# Patient Record
Sex: MALE | Race: WHITE | Employment: FULL TIME | URBAN - METROPOLITAN AREA
[De-identification: names, ages, dates, MRNs, and addresses within clinical notes are randomized per-mention and may not be internally consistent; named-entity substitution may affect disease eponyms.]

---

## 2018-09-17 ENCOUNTER — OFFICE VISIT (OUTPATIENT)
Dept: FAMILY MEDICINE | Facility: OTHER | Age: 33
End: 2018-09-17
Payer: COMMERCIAL

## 2018-09-17 VITALS
RESPIRATION RATE: 16 BRPM | WEIGHT: 149 LBS | SYSTOLIC BLOOD PRESSURE: 124 MMHG | OXYGEN SATURATION: 100 % | HEART RATE: 78 BPM | DIASTOLIC BLOOD PRESSURE: 68 MMHG | TEMPERATURE: 98.6 F

## 2018-09-17 DIAGNOSIS — K64.4 EXTERNAL HEMORRHOIDS: ICD-10-CM

## 2018-09-17 DIAGNOSIS — D62 ANEMIA DUE TO BLOOD LOSS, ACUTE: Primary | ICD-10-CM

## 2018-09-17 DIAGNOSIS — K64.8 INTERNAL HEMORRHOIDS: ICD-10-CM

## 2018-09-17 LAB
BASOPHILS # BLD AUTO: 0 10E9/L (ref 0–0.2)
BASOPHILS NFR BLD AUTO: 0.6 %
DIFFERENTIAL METHOD BLD: ABNORMAL
EOSINOPHIL # BLD AUTO: 0.1 10E9/L (ref 0–0.7)
EOSINOPHIL NFR BLD AUTO: 1.4 %
ERYTHROCYTE [DISTWIDTH] IN BLOOD BY AUTOMATED COUNT: 12.3 % (ref 10–15)
HCT VFR BLD AUTO: 30.7 % (ref 40–53)
HGB BLD-MCNC: 10.1 G/DL (ref 13.3–17.7)
LYMPHOCYTES # BLD AUTO: 2.2 10E9/L (ref 0.8–5.3)
LYMPHOCYTES NFR BLD AUTO: 34.8 %
MCH RBC QN AUTO: 29.5 PG (ref 26.5–33)
MCHC RBC AUTO-ENTMCNC: 32.9 G/DL (ref 31.5–36.5)
MCV RBC AUTO: 90 FL (ref 78–100)
MONOCYTES # BLD AUTO: 0.7 10E9/L (ref 0–1.3)
MONOCYTES NFR BLD AUTO: 11.2 %
NEUTROPHILS # BLD AUTO: 3.2 10E9/L (ref 1.6–8.3)
NEUTROPHILS NFR BLD AUTO: 52 %
PLATELET # BLD AUTO: 224 10E9/L (ref 150–450)
RBC # BLD AUTO: 3.42 10E12/L (ref 4.4–5.9)
WBC # BLD AUTO: 6.2 10E9/L (ref 4–11)

## 2018-09-17 PROCEDURE — 85025 COMPLETE CBC W/AUTO DIFF WBC: CPT | Performed by: NURSE PRACTITIONER

## 2018-09-17 PROCEDURE — 99203 OFFICE O/P NEW LOW 30 MIN: CPT | Performed by: NURSE PRACTITIONER

## 2018-09-17 PROCEDURE — 36415 COLL VENOUS BLD VENIPUNCTURE: CPT | Performed by: NURSE PRACTITIONER

## 2018-09-17 ASSESSMENT — PAIN SCALES - GENERAL: PAINLEVEL: NO PAIN (0)

## 2018-09-17 ASSESSMENT — PATIENT HEALTH QUESTIONNAIRE - PHQ9
10. IF YOU CHECKED OFF ANY PROBLEMS, HOW DIFFICULT HAVE THESE PROBLEMS MADE IT FOR YOU TO DO YOUR WORK, TAKE CARE OF THINGS AT HOME, OR GET ALONG WITH OTHER PEOPLE: NOT DIFFICULT AT ALL
SUM OF ALL RESPONSES TO PHQ QUESTIONS 1-9: 1
SUM OF ALL RESPONSES TO PHQ QUESTIONS 1-9: 1

## 2018-09-17 NOTE — PROGRESS NOTES
SUBJECTIVE:   Ganga Candelario is a 32 year old male who presents to clinic today for the following health issues:      History of Present Illness   Frequency of exercise:  2-3 days/week  Duration of exercise:  30-45 minutes  Taking medications regularly:  Yes  Medication side effects:  Not applicable and None  Additional concerns today:  No    Hemorrhoids  Onset: Several years, worse in the past two weeks    Description:   Pain: no   Itching: no     Accompanying Signs & Symptoms:  Blood streaked toilet paper: YES  Blood in stool: YES  Changes in stool pattern: no     History:   Any previous GI studies done:Abdominal x-rays, two years ago  Family History of colon cancer: YES- aunt on paternal side    Precipitating factors:   None    Alleviating factors:  Preperation H with good relief    Therapies Tried and outcome: preparation H  Problem list and histories reviewed & adjusted, as indicated.  Additional history: as documented    Has taken iron for past two days.  Worried about anemia due to blood loss in toilet.    Has had hemorriods for three years.  Father had hemorroids.  He did have banding.  Using creams- for a year and half off and on.  Helps so when external flares helps with that.  Bowel movements every day.  Used to be twice a day.  Is now taking more time to use the bathroom- 20 minutes to 30 minutes.  Straining.  When has it is soft.  Felt was started to get constipated.  Got worried with bleeding.  Started stool softeners.  Is drinking a lot of water.  Doing metamucil.  Started last night.  Felt times when he went it was so dark red he could not see through water.  Bleeding stops by pushing hemorrhoids in will stop.  Wears pads as he has times where he just started bleeding.    Feels dizzy.  If doing something active will feel heart race like going up stairs.      Paternal aunt with colon cancer.  Aunt with genetic cell- states his father did not have the genetic cell.    There is no problem list on file  for this patient.    History reviewed. No pertinent surgical history.    Social History   Substance Use Topics     Smoking status: Never Smoker     Smokeless tobacco: Never Used     Alcohol use 0.6 oz/week     1 Glasses of wine per week      Comment: very seldom     History reviewed. No pertinent family history.      No current outpatient prescriptions on file.     No Known Allergies    ROS:  Constitutional, HEENT, cardiovascular, pulmonary, gi and gu systems are negative, except as otherwise noted.    OBJECTIVE:     /68  Pulse 78  Temp 98.6  F (37  C)  Resp 16  Wt 149 lb (67.6 kg)  SpO2 100%  There is no height or weight on file to calculate BMI.   GENERAL: alert, no distress and pale  NECK: no adenopathy, no asymmetry, masses, or scars and thyroid normal to palpation  RESP: lungs clear to auscultation - no rales, rhonchi or wheezes  CV: regular rate and rhythm, normal S1 S2, no S3 or S4, no murmur, click or rub, no peripheral edema and peripheral pulses strong  ABDOMEN: soft, nontender, no hepatosplenomegaly, no masses and bowel sounds normal  RECTAL (male): external and internal hemorrhoids, exam was uncomfortable for patient  MS: no gross musculoskeletal defects noted, no edema    Diagnostic Test Results:  Results for orders placed or performed in visit on 09/17/18 (from the past 24 hour(s))   CBC with platelets and differential   Result Value Ref Range    WBC 6.2 4.0 - 11.0 10e9/L    RBC Count 3.42 (L) 4.4 - 5.9 10e12/L    Hemoglobin 10.1 (L) 13.3 - 17.7 g/dL    Hematocrit 30.7 (L) 40.0 - 53.0 %    MCV 90 78 - 100 fl    MCH 29.5 26.5 - 33.0 pg    MCHC 32.9 31.5 - 36.5 g/dL    RDW 12.3 10.0 - 15.0 %    Platelet Count 224 150 - 450 10e9/L    % Neutrophils 52.0 %    % Lymphocytes 34.8 %    % Monocytes 11.2 %    % Eosinophils 1.4 %    % Basophils 0.6 %    Absolute Neutrophil 3.2 1.6 - 8.3 10e9/L    Absolute Lymphocytes 2.2 0.8 - 5.3 10e9/L    Absolute Monocytes 0.7 0.0 - 1.3 10e9/L    Absolute  Eosinophils 0.1 0.0 - 0.7 10e9/L    Absolute Basophils 0.0 0.0 - 0.2 10e9/L    Diff Method Automated Method        ASSESSMENT/PLAN:     Problem List Items Addressed This Visit     None      Visit Diagnoses     Anemia due to blood loss, acute    -  Primary    Relevant Orders    CBC with platelets and differential (Completed)    GENERAL SURG ADULT REFERRAL    External hemorrhoids        Relevant Orders    GENERAL SURG ADULT REFERRAL    Internal hemorrhoids        Relevant Orders    GENERAL SURG ADULT REFERRAL           Acute blood loss anemia likely secondary to his hemorrhoids.  No other signs of blood loss.  He is dizzy and has some exertional fatigue- was able to get him in with General Surgery in two days.  Advised to seek ED care if bleeding or symptoms worsen.  Lisa Watts, CHRIS Watts, LOCO  [facility]  Answers for HPI/ROS submitted by the patient on 9/17/2018   PHQ-2 Score: 0  If you checked off any problems, how difficult have these problems made it for you to do your work, take care of things at home, or get along with other people?: Not difficult at all  PHQ9 TOTAL SCORE: 1

## 2018-09-17 NOTE — PATIENT INSTRUCTIONS
We have scheduled you in [place] with the general surgeon.  If this time does not work for you please call [phone]  Wednesday [place] at 1115 am with Dr. Khoury              Hemorrhoids    Hemorrhoids are swollen and inflamed veins inside the rectum and near the anus. The rectum is the last several inches of the colon. The anus is the passage between the rectum and the outside of the body.  Causes  The veins can become swollen due to increased pressure in them. This is most often caused by:    Chronic constipation or diarrhea    Straining when having a bowel movement    Sitting too long on the toilet    A low-fiber diet    Pregnancy  Symptoms    Bleeding from the rectum (this may be noticeable after bowel movements)    Lump near the anus    Itching around the anus    Pain around the anus  There are different types of hemorrhoids. Depending on the type you have and the severity, you may be able to treat yourself at home. In some cases, a procedure may be the best treatment option. Your healthcare provider can tell you more about this, if needed.  Home care  General care    To get relief from pain or itching, try:  ? Medicines. Your healthcare provider may recommend stool softeners, suppositories, or laxatives to help manage constipation. Use these exactly as directed.  ? Sitz baths. A sitz bath involves sitting in a few inches of warm bath water. Be careful not to make the water so hot that you burn yourself--test it before sitting in it. Soak for about 10 to 15 minutes a few times a day. This may help relieve pain.  ? Topical products. Your healthcare provider may prescribe or recommend creams, ointments, or pads that can be applied to the hemorrhoid. Use these exactly as directed.  Tips to help prevent hemorrhoids    Eat more fiber. Fiber adds bulk to stool and absorbs water as it moves through your colon. This makes stool softer and easier to pass.  ? Increase the fiber in your diet with more  fiber-rich foods. These include fresh fruit, vegetables, and whole grains.  ? Take a fiber supplement or bulking agent, if advised by your healthcare provider. These include products such as psyllium or methylcellulose.    Drink more water. Your healthcare provider may direct you to drink plenty of water. This can help keep stool soft.    Be more active. Frequent exercise aids digestion and helps prevent constipation. It may also help make bowel movements more regular.    Don t strain during bowel movements. This can make hemorrhoids more likely. Also, don t sit on the toilet for long periods of time.  Follow-up care  Follow up with your healthcare provider as advised. If a culture or imaging tests were done, someone will let you know the results when they are ready. This may take a few days or longer. If your healthcare provider recommends a procedure for your hemorrhoids, these options can be discussed. Options may include surgery and outpatient office treatments.  When to seek medical advice  Call your healthcare provider right away if any of these occur:    Increased bleeding from the rectum    Increased pain around the rectum or anus    Weakness or dizziness  Call 911  Call 911 if any of these occur:    Trouble breathing or swallowing    Fainting or loss of consciousness    Unusually fast heart rate    Vomiting blood    Large amounts of blood in stool or black, tarry stools  Date Last Reviewed: 9/1/2017 2000-2017 [employer]. [address] [place] [address], [place] [address]. All rights reserved. This information is not intended as a substitute for professional medical care. Always follow your healthcare professional's instructions.        Treating Hemorrhoids: Self-Care    Follow your healthcare provider s advice about caring for your hemorrhoids at home. Some treatments help relieve symptoms right away. Others involve making changes in your diet and exercise habits. These can help ease constipation  and prevent hemorrhoid symptoms from coming back.  Relieving symptoms  Your healthcare provider may prescribe anti-inflammatory medicine to help ease your symptoms. The following tips will also help relieve pain and swelling.    Take sitz baths. Taking a sitz bath means sitting in a few inches of warm bath water. Soaking for 10 minutes twice a day can provide welcome relief from painful hemorrhoids. It can also help the area stay clean.    Develop good bowel habits. Use the bathroom when you need to. Don t ignore the urge to move your bowels. This can lead to constipation, hard stools, and straining. Also, don t read while on the toilet. Sit only as long as needed. Wipe gently with soft, unscented toilet tissue or baby wipes.    Use ice packs. Placing an ice pack on a thrombosed external hemorrhoid can help relieve pain right away. It will also help reduce the blood clot. Use the ice for 15 to 20 minutes at a time. Keep a cloth between the ice and your skin to prevent skin damage.    Use other measures. Laxatives and enemas can help ease constipation. But use them only on your healthcare provider s advice. For symptom relief, try using cotton pads soaked in witch hazel. These are available at most drugstores. Over-the-counter hemorrhoid ointments and petroleum jelly can also provide relief.  Add fiber to your diet  Adding fiber to your diet can help relieve constipation by making stools softer and easier to pass. To increase your fiber intake, your healthcare provider may recommend a bulking agent, such as psyllium. This is a high-fiber supplement available at most grocery stores and drugstores. Eating more fiber-rich foods will also help. There are two types of fiber:    Insoluble fiber is the main ingredient in bulking agents. It s also found in foods such as wheat bran, whole-grain breads, fresh fruits, and vegetables.    Soluble fiber is found in foods such as oat bran. Although soluble fiber is good for you, it  may not ease constipation as much as foods high in insoluble fiber.  Drink more water  Along with a high-fiber diet, drinking more water can help ease constipation. This is because insoluble fiber absorbs water, making stools soft and bulky. Be sure to drink plenty of water throughout the day. Drinking fruit juices, such as prune juice or apple juice, can also help prevent constipation.  Get more exercise  Regular exercise aids digestion and helps prevent constipation. It s also great for your health. So talk with your healthcare provider about starting an exercise program. Low-impact activities, such as swimming or walking, are good places to start. Take it easy at first. And remember to drink plenty of water when you exercise.  High-fiber foods  High-fiber foods offer many benefits. By making your stools softer, they help heal and prevent swollen hemorrhoids. They may also help reduce the risk of colon and rectal cancer. Best of all, they re usually low in calories and taste great. Here are some examples of fiber-rich foods.    Whole grains, such as wheat bran, corn bran, and brown rice.    Vegetables, especially carrots, broccoli, cabbage, and peas.    Fruits, such as apples, bananas, raisins, peaches, and pears.    Nuts and legumes, especially peanuts, lentils, and kidney beans.  Easy ways to add fiber  The tips below offer some simple ways to add more high-fiber foods to your meals.    Start your day with a high-fiber breakfast. Eat a wheat bran cereal along with a sliced banana. Or, try peanut butter on whole-wheat toast.    Eat carrot sticks for snacks. They re easy to prepare, taste great, and are low in calories.    Use whole-grain breads instead of white bread for sandwiches.    Eat fruits for treats. Try an apple and some raisins instead of a candy bar.   Date Last Reviewed: 7/1/2016 2000-2017 [employer]. [address] [place] [address], [place] [address]. All rights reserved. This information is  not intended as a substitute for professional medical care. Always follow your healthcare professional's instructions.        Iron Supplements  Most people think of iron as a metal used to make pots, frying pans, and soup kettles. This same metal (or mineral) also plays a vital role in the body. Iron helps the blood cells carry oxygen. When you don t get enough iron, you may feel tired and lack energy. Over time, without enough iron, your body makes fewer red blood cells. This can cause a condition called iron-deficiency anemia. Since your body doesn t make iron, you must get it from foods or supplements.     Food sources of iron  Iron is found in a few types of foods. Good sources include:    Red meat, poultry, fish, eggs    Dried fruits (especially raisins, prunes, figs)    Legumes such as dried beans and lentils    Breads and cereals with iron added    Blackstrap molasses    Spinach    Foods cooked in cast-iron pans. This is especially true of acidic foods, such as tomatoes and sidney.   Why use a supplement?  Women often need additional iron because they lose blood during their menstrual period. But even grown men and boys may need a supplement at some point. You may need an iron supplement if any of the following is true for you:    I rarely eat foods that are high in iron (such as red meat, poultry, dried beans, and foods with iron added).    I am a vegetarian and I rarely eat legumes (dried beans, peas, lentils).    I have heavy menstrual periods.    I am pregnant or breastfeeding.    I have been diagnosed with iron-deficiency anemia.  If you take iron  Here are some tips to help you get the most from an iron supplement:    Take it with vitamin C for better absorption.    Don t take an iron supplement with milk. The calcium in milk limits iron absorption.    Iron supplements can cause constipation. To prevent this, drink plenty of water, eat high-fiber foods, and exercise often. Also try an iron supplement  with an added stool softener.    Eat a healthy diet to get all the nutrients your body needs.  Recommended iron intake  The amount of iron each person needs is different, and varies by age. Women who are pregnant or breastfeeding need extra iron. But taking more than the suggested amount is not always healthy. Your healthcare provider can help you choose the right amount of iron for you.   Date Last Reviewed: 6/1/2017 2000-2017 [employer]. [address] [place] [address], [place] [address]. All rights reserved. This information is not intended as a substitute for professional medical care. Always follow your healthcare professional's instructions.

## 2018-09-17 NOTE — MR AVS SNAPSHOT
After Visit Summary   9/17/2018    Ganga Candelario    MRN: [id number]           Patient Information     Date Of Birth          1985        Visit Information        Provider Department      9/17/2018 5:20 PM Lisa Watts NP [facility]        Today's Diagnoses     Anemia due to blood loss, acute    -  1    External hemorrhoids        Internal hemorrhoids          Care Instructions    We have scheduled you in [place] with the general surgeon.  If this time does not work for you please call [phone]  Wednesday [place] at 1115 am with Dr. Khoury              Hemorrhoids    Hemorrhoids are swollen and inflamed veins inside the rectum and near the anus. The rectum is the last several inches of the colon. The anus is the passage between the rectum and the outside of the body.  Causes  The veins can become swollen due to increased pressure in them. This is most often caused by:    Chronic constipation or diarrhea    Straining when having a bowel movement    Sitting too long on the toilet    A low-fiber diet    Pregnancy  Symptoms    Bleeding from the rectum (this may be noticeable after bowel movements)    Lump near the anus    Itching around the anus    Pain around the anus  There are different types of hemorrhoids. Depending on the type you have and the severity, you may be able to treat yourself at home. In some cases, a procedure may be the best treatment option. Your healthcare provider can tell you more about this, if needed.  Home care  General care    To get relief from pain or itching, try:  ? Medicines. Your healthcare provider may recommend stool softeners, suppositories, or laxatives to help manage constipation. Use these exactly as directed.  ? Sitz baths. A sitz bath involves sitting in a few inches of warm bath water. Be careful not to make the water so hot that you burn yourself--test it before sitting in it. Soak for about 10 to 15 minutes a few times a  day. This may help relieve pain.  ? Topical products. Your healthcare provider may prescribe or recommend creams, ointments, or pads that can be applied to the hemorrhoid. Use these exactly as directed.  Tips to help prevent hemorrhoids    Eat more fiber. Fiber adds bulk to stool and absorbs water as it moves through your colon. This makes stool softer and easier to pass.  ? Increase the fiber in your diet with more fiber-rich foods. These include fresh fruit, vegetables, and whole grains.  ? Take a fiber supplement or bulking agent, if advised by your healthcare provider. These include products such as psyllium or methylcellulose.    Drink more water. Your healthcare provider may direct you to drink plenty of water. This can help keep stool soft.    Be more active. Frequent exercise aids digestion and helps prevent constipation. It may also help make bowel movements more regular.    Don t strain during bowel movements. This can make hemorrhoids more likely. Also, don t sit on the toilet for long periods of time.  Follow-up care  Follow up with your healthcare provider as advised. If a culture or imaging tests were done, someone will let you know the results when they are ready. This may take a few days or longer. If your healthcare provider recommends a procedure for your hemorrhoids, these options can be discussed. Options may include surgery and outpatient office treatments.  When to seek medical advice  Call your healthcare provider right away if any of these occur:    Increased bleeding from the rectum    Increased pain around the rectum or anus    Weakness or dizziness  Call 911  Call 911 if any of these occur:    Trouble breathing or swallowing    Fainting or loss of consciousness    Unusually fast heart rate    Vomiting blood    Large amounts of blood in stool or black, tarry stools  Date Last Reviewed: 9/1/2017 2000-2017 [employer]. [address] [place] [address]. All rights  reserved. This information is not intended as a substitute for professional medical care. Always follow your healthcare professional's instructions.        Treating Hemorrhoids: Self-Care    Follow your healthcare provider s advice about caring for your hemorrhoids at home. Some treatments help relieve symptoms right away. Others involve making changes in your diet and exercise habits. These can help ease constipation and prevent hemorrhoid symptoms from coming back.  Relieving symptoms  Your healthcare provider may prescribe anti-inflammatory medicine to help ease your symptoms. The following tips will also help relieve pain and swelling.    Take sitz baths. Taking a sitz bath means sitting in a few inches of warm bath water. Soaking for 10 minutes twice a day can provide welcome relief from painful hemorrhoids. It can also help the area stay clean.    Develop good bowel habits. Use the bathroom when you need to. Don t ignore the urge to move your bowels. This can lead to constipation, hard stools, and straining. Also, don t read while on the toilet. Sit only as long as needed. Wipe gently with soft, unscented toilet tissue or baby wipes.    Use ice packs. Placing an ice pack on a thrombosed external hemorrhoid can help relieve pain right away. It will also help reduce the blood clot. Use the ice for 15 to 20 minutes at a time. Keep a cloth between the ice and your skin to prevent skin damage.    Use other measures. Laxatives and enemas can help ease constipation. But use them only on your healthcare provider s advice. For symptom relief, try using cotton pads soaked in witch hazel. These are available at most drugstores. Over-the-counter hemorrhoid ointments and petroleum jelly can also provide relief.  Add fiber to your diet  Adding fiber to your diet can help relieve constipation by making stools softer and easier to pass. To increase your fiber intake, your healthcare provider may recommend a bulking agent,  such as psyllium. This is a high-fiber supplement available at most grocery stores and drugstores. Eating more fiber-rich foods will also help. There are two types of fiber:    Insoluble fiber is the main ingredient in bulking agents. It s also found in foods such as wheat bran, whole-grain breads, fresh fruits, and vegetables.    Soluble fiber is found in foods such as oat bran. Although soluble fiber is good for you, it may not ease constipation as much as foods high in insoluble fiber.  Drink more water  Along with a high-fiber diet, drinking more water can help ease constipation. This is because insoluble fiber absorbs water, making stools soft and bulky. Be sure to drink plenty of water throughout the day. Drinking fruit juices, such as prune juice or apple juice, can also help prevent constipation.  Get more exercise  Regular exercise aids digestion and helps prevent constipation. It s also great for your health. So talk with your healthcare provider about starting an exercise program. Low-impact activities, such as swimming or walking, are good places to start. Take it easy at first. And remember to drink plenty of water when you exercise.  High-fiber foods  High-fiber foods offer many benefits. By making your stools softer, they help heal and prevent swollen hemorrhoids. They may also help reduce the risk of colon and rectal cancer. Best of all, they re usually low in calories and taste great. Here are some examples of fiber-rich foods.    Whole grains, such as wheat bran, corn bran, and brown rice.    Vegetables, especially carrots, broccoli, cabbage, and peas.    Fruits, such as apples, bananas, raisins, peaches, and pears.    Nuts and legumes, especially peanuts, lentils, and kidney beans.  Easy ways to add fiber  The tips below offer some simple ways to add more high-fiber foods to your meals.    Start your day with a high-fiber breakfast. Eat a wheat bran cereal along with a sliced banana. Or, try  peanut butter on whole-wheat toast.    Eat carrot sticks for snacks. They re easy to prepare, taste great, and are low in calories.    Use whole-grain breads instead of white bread for sandwiches.    Eat fruits for treats. Try an apple and some raisins instead of a candy bar.   Date Last Reviewed: 7/1/2016 2000-2017 [employer]. [address] [place] [address], [place] [address]. All rights reserved. This information is not intended as a substitute for professional medical care. Always follow your healthcare professional's instructions.        Iron Supplements  Most people think of iron as a metal used to make pots, frying pans, and soup kettles. This same metal (or mineral) also plays a vital role in the body. Iron helps the blood cells carry oxygen. When you don t get enough iron, you may feel tired and lack energy. Over time, without enough iron, your body makes fewer red blood cells. This can cause a condition called iron-deficiency anemia. Since your body doesn t make iron, you must get it from foods or supplements.     Food sources of iron  Iron is found in a few types of foods. Good sources include:    Red meat, poultry, fish, eggs    Dried fruits (especially raisins, prunes, figs)    Legumes such as dried beans and lentils    Breads and cereals with iron added    Blackstrap molasses    Spinach    Foods cooked in cast-iron pans. This is especially true of acidic foods, such as tomatoes and sidney.   Why use a supplement?  Women often need additional iron because they lose blood during their menstrual period. But even grown men and boys may need a supplement at some point. You may need an iron supplement if any of the following is true for you:    I rarely eat foods that are high in iron (such as red meat, poultry, dried beans, and foods with iron added).    I am a vegetarian and I rarely eat legumes (dried beans, peas, lentils).    I have heavy menstrual periods.    I am pregnant or  breastfeeding.    I have been diagnosed with iron-deficiency anemia.  If you take iron  Here are some tips to help you get the most from an iron supplement:    Take it with vitamin C for better absorption.    Don t take an iron supplement with milk. The calcium in milk limits iron absorption.    Iron supplements can cause constipation. To prevent this, drink plenty of water, eat high-fiber foods, and exercise often. Also try an iron supplement with an added stool softener.    Eat a healthy diet to get all the nutrients your body needs.  Recommended iron intake  The amount of iron each person needs is different, and varies by age. Women who are pregnant or breastfeeding need extra iron. But taking more than the suggested amount is not always healthy. Your healthcare provider can help you choose the right amount of iron for you.   Date Last Reviewed: 6/1/2017 2000-2017 [employer]. [address] [place] [address]. All rights reserved. This information is not intended as a substitute for professional medical care. Always follow your healthcare professional's instructions.                Follow-ups after your visit        Additional Services     GENERAL SURG ADULT REFERRAL       Your provider has referred you to: [facility]: [facility] [phone]   [email or web address]    Please be aware that coverage of these services is subject to the terms and limitations of your health insurance plan.  Call member services at your health plan with any benefit or coverage questions.      Please bring the following with you to your appointment:    (1) Any X-Rays, CTs or MRIs which have been performed.  Contact the facility where they were done to arrange for  prior to your scheduled appointment.   (2) List of current medications   (3) This referral request   (4) Any documents/labs given to you for this referral                  Follow-up notes from your care  team     Return in about 2 days (around 9/19/2018) for general surgery.      Your next 10 appointments already scheduled     Sep 19, 2018 11:15 AM CDT   New Visit with Danilo Khoury,    [facility] ([facility])    [address] [place] [address] [phone]              Who to contact     If you have questions or need follow up information about today's clinic visit or your schedule please contact [facility] directly at [phone].  Normal or non-critical lab and imaging results will be communicated to you by MyChart, letter or phone within 4 business days after the clinic has received the results. If you do not hear from us within 7 days, please contact the clinic through MyChart or phone. If you have a critical or abnormal lab result, we will notify you by phone as soon as possible.  Submit refill requests through [employer] or call your pharmacy and they will forward the refill request to us. Please allow 3 business days for your refill to be completed.          Additional Information About Your Visit        Care EveryWhere ID     This is your Care EveryWhere ID. This could be used by other organizations to access your [place] medical records  [id number]        Your Vitals Were     Pulse Temperature Respirations Pulse Oximetry          78 98.6  F (37  C) 16 100%         Blood Pressure from Last 3 Encounters:   09/17/18 124/68    Weight from Last 3 Encounters:   09/17/18 149 lb (67.6 kg)              We Performed the Following     CBC with platelets and differential     GENERAL SURG ADULT REFERRAL        Primary Care Provider Fax #    Physician No Ref-Primary [phone]       No address on file        Equal Access to Services     JOSEPH JOHNSTON : Elicia Weber, warenea mehta, qaybta kaliat williamson. So [facility] [phone].    ATENCIÓN: Si habla español, tiene a duckworth disposición  servicios gratuitos de asistencia lingüística. Keisha lema [phone].    We comply with applicable federal civil rights laws and [place] laws. We do not discriminate on the basis of race, color, national origin, age, disability, sex, sexual orientation, or gender identity.            Thank you!     Thank you for choosing [facility]  for your care. Our goal is always to provide you with excellent care. Hearing back from our patients is one way we can continue to improve our services. Please take a few minutes to complete the written survey that you may receive in the mail after your visit with us. Thank you!             Your Updated Medication List - Protect others around you: Learn how to safely use, store and throw away your medicines at www.disposemymeds.org.      Notice  As of 9/17/2018  6:04 PM    You have not been prescribed any medications.

## 2018-09-18 ASSESSMENT — PATIENT HEALTH QUESTIONNAIRE - PHQ9: SUM OF ALL RESPONSES TO PHQ QUESTIONS 1-9: 1

## 2018-09-19 ENCOUNTER — OFFICE VISIT (OUTPATIENT)
Dept: SURGERY | Facility: CLINIC | Age: 33
End: 2018-09-19
Payer: COMMERCIAL

## 2018-09-19 VITALS
TEMPERATURE: 97.4 F | HEIGHT: 74 IN | DIASTOLIC BLOOD PRESSURE: 68 MMHG | SYSTOLIC BLOOD PRESSURE: 122 MMHG | BODY MASS INDEX: 19.25 KG/M2 | WEIGHT: 150 LBS

## 2018-09-19 DIAGNOSIS — K64.8 BLEEDING INTERNAL HEMORRHOIDS: Primary | ICD-10-CM

## 2018-09-19 PROCEDURE — 99203 OFFICE O/P NEW LOW 30 MIN: CPT | Mod: 25 | Performed by: SURGERY

## 2018-09-19 PROCEDURE — 46221 LIGATION OF HEMORRHOID(S): CPT | Performed by: SURGERY

## 2018-09-19 NOTE — LETTER
9/19/2018        RE: Ganga Candelario  [address]  [place] [address]      Ganga was seen and treated in our clinic on 9/19/18. Please excuse from work today and tomorrow 9/19-9/20.      Sincerely,        aDnilo Khoury, DO

## 2018-09-19 NOTE — PROGRESS NOTES
"Patient seen in consultation for bleeding internal hemorrhoids by Lisa Watts    HPI:  Patient is a 32 year old male with several year history of hemorrhoid problems. Recently he has had significant bleeding associated with almost every bowel movement. He says his stool is relatively soft and normal, but he is straining to have them. He started taking metamucil and miralax a few days ago and says his stools seems to be more soft now. He has felt weak and tired so they checked a Hgb and he is anemic as well, presumably from his bleeding hemorrhoids. He has some family history of colon cancer but none in his primary relatives. He uses wet wipes.    Review Of Systems    Skin: negative  Ears/Nose/Throat: negative  Respiratory: No shortness of breath, dyspnea on exertion, cough, or hemoptysis  Cardiovascular: negative  Gastrointestinal: negative  Genitourinary: negative  Musculoskeletal: negative  Neurologic: negative  Hematologic/Lymphatic/Immunologic: negative  Endocrine: negative      No past medical history on file.    No past surgical history on file.    No family history on file.    Social History     Social History     Marital status:      Spouse name: N/A     Number of children: N/A     Years of education: N/A     Occupational History     Not on file.     Social History Main Topics     Smoking status: Never Smoker     Smokeless tobacco: Never Used     Alcohol use 0.6 oz/week     1 Glasses of wine per week      Comment: very seldom     Drug use: No     Sexual activity: Yes     Partners: Female     Other Topics Concern     Not on file     Social History Narrative       Current Outpatient Prescriptions   Medication Sig Dispense Refill     psyllium (METAMUCIL) 58.6 % POWD Take by mouth daily         Medications and history reviewed    Physical exam:  Vitals: /68  Temp 97.4  F (36.3  C) (Temporal)  Ht 1.88 m (6' 2\")  Wt 68 kg (150 lb)  BMI 19.26 kg/m2  BMI= Body mass index is 19.26 " kg/(m^2).    Constitutional: Healthy, alert, non-distressed   Head: Normo-cephalic, atraumatic, no lesions, masses or tenderness   Cardiovascular: RRR, no new murmurs, +S1, +S2 heart sounds, no clicks, rubs or gallops   Respiratory: CTAB, no rales, rhonchi or wheezing, equal chest rise, good respiratory effort   Gastrointestinal: Soft, non-tender, non distended, no rebound rigidity or guarding, no masses or hernias palpated   : Deferred  Rectal: Small skin tags/external hemorrhoids, not thrombosed. Internally he has several large hemorrhoids, Grade I-II, some evidence of bleeding on posterior columns on either side of midline  Musculoskeletal: Moves all extremities, normal  strength, no deformities noted   Skin: No suspicious lesions or rashes   Psychiatric: Mentation appears normal, affect appropriate   Hematologic/Lymphatic/Immunologic: Normal cervical and supraclavicular lymph nodes   Patient able to get up on table without difficulty.    Labs show:  No results found for this or any previous visit (from the past 24 hour(s)).      Assessment:     ICD-10-CM    1. Bleeding internal hemorrhoids K64.8      Plan: we will band 2 of his internal hemorrhoids today, specifically, the two that appear to have been bleeding recently. We discussed other healthy bathroom habits and a handout was provided. He will return or call if his bleeding does not significantly improve with this banding. He may require more than one banding session. We discussed the procedure in detail and the risks and what complications to look for. See below for procedure.    Danilo Khoury,     PROCEDURE: internal hemorrhoid banding x2   Written consent was obtained    Using the usual technique, suction internal hemorrhoid banding was performed. Two separate hemorrhoid columns were isolated and banded using the suction device. No pain after placement of bands. Minor bleeding from the speculum exam. The procedure was well tolerated and  without complications.

## 2018-09-19 NOTE — MR AVS SNAPSHOT
"              After Visit Summary   9/19/2018    Ganga Candelario    MRN: [id number]           Patient Information     Date Of Birth          1985        Visit Information        Provider Department      9/19/2018 11:15 AM Danilo Khoury, DO [facility]        Today's Diagnoses     Bleeding internal hemorrhoids    -  1       Follow-ups after your visit        Who to contact     If you have questions or need follow up information about today's clinic visit or your schedule please contact [facility] directly at [phone].  Normal or non-critical lab and imaging results will be communicated to you by MyChart, letter or phone within 4 business days after the clinic has received the results. If you do not hear from us within 7 days, please contact the clinic through MyChart or phone. If you have a critical or abnormal lab result, we will notify you by phone as soon as possible.  Submit refill requests through [employer] or call your pharmacy and they will forward the refill request to us. Please allow 3 business days for your refill to be completed.          Additional Information About Your Visit        Care EveryWhere ID     This is your Care EveryWhere ID. This could be used by other organizations to access your [place] medical records  [id number]        Your Vitals Were     Temperature Height BMI (Body Mass Index)             97.4  F (36.3  C) (Temporal) 1.88 m (6' 2\") 19.26 kg/m2          Blood Pressure from Last 3 Encounters:   09/19/18 122/68   09/17/18 124/68    Weight from Last 3 Encounters:   09/19/18 68 kg (150 lb)   09/17/18 67.6 kg (149 lb)              Today, you had the following     No orders found for display       Primary Care Provider Fax #    Physician No Ref-Primary [phone]       No address on file        Equal Access to Services     CHRISTINA JOHNSTON AH: Elicia Weber, sol mehta, jenise vega, liat butt " new lakhani ah. So [facility] [phone].    ATENCIÓN: Si habla radhames, tiene a duckworth disposición servicios gratuitos de asistencia lingüística. Keisha al [phone].    We comply with applicable federal civil rights laws and [place] laws. We do not discriminate on the basis of race, color, national origin, age, disability, sex, sexual orientation, or gender identity.            Thank you!     Thank you for choosing [facility]  for your care. Our goal is always to provide you with excellent care. Hearing back from our patients is one way we can continue to improve our services. Please take a few minutes to complete the written survey that you may receive in the mail after your visit with us. Thank you!             Your Updated Medication List - Protect others around you: Learn how to safely use, store and throw away your medicines at www.disposemymeds.org.          This list is accurate as of 9/19/18 12:03 PM.  Always use your most recent med list.                   Brand Name Dispense Instructions for use Diagnosis    psyllium 58.6 % Powd    METAMUCIL     Take by mouth daily

## 2018-09-19 NOTE — LETTER
9/19/2018         RE: Ganga Candelario  [address] [address]  [place] [address]        Dear Colleague,    Thank you for referring your patient, Ganga Candelario, to the [facility]. Please see a copy of my visit note below.    Patient seen in consultation for bleeding internal hemorrhoids by Lisa Watts    HPI:  Patient is a 32 year old male with several year history of hemorrhoid problems. Recently he has had significant bleeding associated with almost every bowel movement. He says his stool is relatively soft and normal, but he is straining to have them. He started taking metamucil and miralax a few days ago and says his stools seems to be more soft now. He has felt weak and tired so they checked a Hgb and he is anemic as well, presumably from his bleeding hemorrhoids. He has some family history of colon cancer but none in his primary relatives. He uses wet wipes.    Review Of Systems    Skin: negative  Ears/Nose/Throat: negative  Respiratory: No shortness of breath, dyspnea on exertion, cough, or hemoptysis  Cardiovascular: negative  Gastrointestinal: negative  Genitourinary: negative  Musculoskeletal: negative  Neurologic: negative  Hematologic/Lymphatic/Immunologic: negative  Endocrine: negative      No past medical history on file.    No past surgical history on file.    No family history on file.    Social History     Social History     Marital status:      Spouse name: N/A     Number of children: N/A     Years of education: N/A     Occupational History     Not on file.     Social History Main Topics     Smoking status: Never Smoker     Smokeless tobacco: Never Used     Alcohol use 0.6 oz/week     1 Glasses of wine per week      Comment: very seldom     Drug use: No     Sexual activity: Yes     Partners: Female     Other Topics Concern     Not on file     Social History Narrative       Current Outpatient Prescriptions   Medication Sig Dispense Refill     psyllium (METAMUCIL) 58.6 %  "POWD Take by mouth daily         Medications and history reviewed    Physical exam:  Vitals: /68  Temp 97.4  F (36.3  C) (Temporal)  Ht 1.88 m (6' 2\")  Wt 68 kg (150 lb)  BMI 19.26 kg/m2  BMI= Body mass index is 19.26 kg/(m^2).    Constitutional: Healthy, alert, non-distressed   Head: Normo-cephalic, atraumatic, no lesions, masses or tenderness   Cardiovascular: RRR, no new murmurs, +S1, +S2 heart sounds, no clicks, rubs or gallops   Respiratory: CTAB, no rales, rhonchi or wheezing, equal chest rise, good respiratory effort   Gastrointestinal: Soft, non-tender, non distended, no rebound rigidity or guarding, no masses or hernias palpated   : Deferred  Rectal: Small skin tags/external hemorrhoids, not thrombosed. Internally he has several large hemorrhoids, Grade I-II, some evidence of bleeding on posterior columns on either side of midline  Musculoskeletal: Moves all extremities, normal  strength, no deformities noted   Skin: No suspicious lesions or rashes   Psychiatric: Mentation appears normal, affect appropriate   Hematologic/Lymphatic/Immunologic: Normal cervical and supraclavicular lymph nodes   Patient able to get up on table without difficulty.    Labs show:  No results found for this or any previous visit (from the past 24 hour(s)).      Assessment:     ICD-10-CM    1. Bleeding internal hemorrhoids K64.8      Plan: we will band 2 of his internal hemorrhoids today, specifically, the two that appear to have been bleeding recently. We discussed other healthy bathroom habits and a handout was provided. He will return or call if his bleeding does not significantly improve with this banding. He may require more than one banding session. We discussed the procedure in detail and the risks and what complications to look for. See below for procedure.    Danilo Khoury,     PROCEDURE: internal hemorrhoid banding x2   Written consent was obtained    Using the usual technique, suction internal " hemorrhoid banding was performed. Two separate hemorrhoid columns were isolated and banded using the suction device. No pain after placement of bands. Minor bleeding from the speculum exam. The procedure was well tolerated and without complications.       Again, thank you for allowing me to participate in the care of your patient.        Sincerely,        Danilo Khoury, DO

## 2018-09-25 ENCOUNTER — NURSE TRIAGE (OUTPATIENT)
Dept: NURSING | Facility: CLINIC | Age: 33
End: 2018-09-25

## 2018-09-25 ENCOUNTER — HOSPITAL ENCOUNTER (EMERGENCY)
Facility: CLINIC | Age: 33
Discharge: HOME | End: 2018-09-25
Attending: FAMILY MEDICINE | Admitting: FAMILY MEDICINE
Payer: COMMERCIAL

## 2018-09-25 VITALS
HEART RATE: 90 BPM | RESPIRATION RATE: 20 BRPM | TEMPERATURE: 97.5 F | SYSTOLIC BLOOD PRESSURE: 140 MMHG | OXYGEN SATURATION: 100 % | DIASTOLIC BLOOD PRESSURE: 68 MMHG

## 2018-09-25 DIAGNOSIS — K64.8 INTERNAL HEMORRHOIDS: ICD-10-CM

## 2018-09-25 DIAGNOSIS — K64.4 EXTERNAL HEMORRHOIDS: ICD-10-CM

## 2018-09-25 DIAGNOSIS — K62.5 HEMORRHAGE OF RECTUM AND ANUS: ICD-10-CM

## 2018-09-25 DIAGNOSIS — D62 ANEMIA DUE TO BLOOD LOSS, ACUTE: ICD-10-CM

## 2018-09-25 LAB
BASOPHILS # BLD AUTO: 0 10E9/L (ref 0–0.2)
BASOPHILS NFR BLD AUTO: 0.6 %
DIFFERENTIAL METHOD BLD: ABNORMAL
EOSINOPHIL NFR BLD AUTO: 1.4 %
ERYTHROCYTE [DISTWIDTH] IN BLOOD BY AUTOMATED COUNT: 13.2 % (ref 10–15)
HCT VFR BLD AUTO: 29.2 % (ref 40–53)
HGB BLD-MCNC: 9.4 G/DL (ref 13.3–17.7)
IMM GRANULOCYTES # BLD: 0 10E9/L (ref 0–0.4)
IMM GRANULOCYTES NFR BLD: 0.5 %
LYMPHOCYTES # BLD AUTO: 2.1 10E9/L (ref 0.8–5.3)
LYMPHOCYTES NFR BLD AUTO: 33.4 %
MCH RBC QN AUTO: 29.7 PG (ref 26.5–33)
MCHC RBC AUTO-ENTMCNC: 32.2 G/DL (ref 31.5–36.5)
MCV RBC AUTO: 92 FL (ref 78–100)
MONOCYTES # BLD AUTO: 0.8 10E9/L (ref 0–1.3)
MONOCYTES NFR BLD AUTO: 12.6 %
NEUTROPHILS # BLD AUTO: 3.2 10E9/L (ref 1.6–8.3)
NEUTROPHILS NFR BLD AUTO: 51.5 %
NRBC # BLD AUTO: 0 10*3/UL
NRBC BLD AUTO-RTO: 0 /100
PLATELET # BLD AUTO: 220 10E9/L (ref 150–450)
RBC # BLD AUTO: 3.16 10E12/L (ref 4.4–5.9)
WBC # BLD AUTO: 6.3 10E9/L (ref 4–11)

## 2018-09-25 PROCEDURE — 99284 EMERGENCY DEPT VISIT MOD MDM: CPT | Mod: Z6 | Performed by: FAMILY MEDICINE

## 2018-09-25 PROCEDURE — 99283 EMERGENCY DEPT VISIT LOW MDM: CPT | Performed by: FAMILY MEDICINE

## 2018-09-25 PROCEDURE — 85025 COMPLETE CBC W/AUTO DIFF WBC: CPT | Performed by: FAMILY MEDICINE

## 2018-09-25 NOTE — ED AVS SNAPSHOT
[facility] Emergency Department    [address] [place] [address]    Phone:  [phone]    Fax:  [phone]                                       Ganga Candelario   MRN: [id number]    Department:  [facility] Emergency Department   Date of Visit:  9/25/2018           After Visit Summary Signature Page     I have received my discharge instructions, and my questions have been answered. I have discussed any challenges I see with this plan with the nurse or doctor.    ..........................................................................................................................................  Patient/Patient Representative Signature      ..........................................................................................................................................  Patient Representative Print Name and Relationship to Patient    ..................................................               ................................................  Date                                   Time    ..........................................................................................................................................  Reviewed by Signature/Title    ...................................................              ..............................................  Date                                               Time          22EPIC Rev 08/18

## 2018-09-25 NOTE — ED AVS SNAPSHOT
[facility] Emergency Department    [address] [place] [address]    Phone:  [phone]    Fax:  [phone]                                       Ganga Candelario   MRN: [id number]    Department:  [facility] Emergency Department   Date of Visit:  9/25/2018           Patient Information     Date Of Birth          1985        Your diagnoses for this visit were:     Hemorrhage of rectum and anus     Anemia due to blood loss, acute     External hemorrhoids mild, non-inflammed    Internal hemorrhoids        You were seen by Chico Nguyen DO.      Follow-up Information     Follow up with Danilo Khoury DO.    Specialty:  Surgery    Why:  for recheck    Contact information:    [address] [place] [address] [phone]          Follow up with [facility] Emergency Department.    Specialty:  EMERGENCY MEDICINE    Why:  If symptoms worsen    Contact information:    [address] [place] [address] [phone]    Additional information:    From [address]: Exit at [address] on south side of [place]. Turn right on [address] Turn left at stoplight on [address]. [facility] will be in view two blocks ahead        Discharge Instructions       He was scheduled to see Dr. Khoury on October 3 at his first available appointment.  I did send a message through our computer system to Dr. Khoury telling him that you could not be seen until October 3 and that he should see you sooner than that and to contact you directly.    Electronically signed, Chico Nguyen DO      Discharge References/Attachments     HEMORRHOIDS, UNDERSTANDING (ENGLISH)      Your next 10 appointments already scheduled     Oct 03, 2018  8:30 AM CDT   [facility]   [facility]    [address] [address] [phone]              24 Hour Appointment  Hotline       To make an appointment at any [facility], call [phone] ([phone]). If you don't have a family doctor or clinic, we will help you find one. [facility] clinics are conveniently located to serve the needs of you and your family.             Review of your medicines      Our records show that you are taking the medicines listed below. If these are incorrect, please call your family doctor or clinic.        Dose / Directions Last dose taken    psyllium 58.6 % Powd   Commonly known as:  METAMUCIL        Take by mouth daily   Refills:  0                Procedures and tests performed during your visit     CBC with platelets differential      Orders Needing Specimen Collection     None      Pending Results     No orders found from 9/23/2018 to 9/26/2018.            Pending Culture Results     No orders found from 9/23/2018 to 9/26/2018.            Pending Results Instructions     If you had any lab results that were not finalized at the time of your Discharge, you can call the ED Lab Result RN at [phone]. You will be contacted by this team for any positive Lab results or changes in treatment. The nurses are available 7 days a week from 10A to 6:30P.  You can leave a message 24 hours per day and they will return your call.        Thank you for choosing [facility]       Thank you for choosing [facility] for your care. Our goal is always to provide you with excellent care. Hearing back from our patients is one way we can continue to improve our services. Please take a few minutes to complete the written survey that you may receive in the mail after you visit with us. Thank you!        Care EveryWhere ID     This is your Care EveryWhere ID. This could be used by other organizations to access your [facility] medical records  [id number]        Equal Access to Services     CHRISTINA JOHNSTON AH: Elicia Weber, sol mehta, liat ferguson  ah. So [facility] [phone].    ATENCIÓN: Si habla español, tiene a duckworth disposición servicios gratuitos de asistencia lingüística. Llame al [phone].    We comply with applicable federal civil rights laws and [place] laws. We do not discriminate on the basis of race, color, national origin, age, disability, sex, sexual orientation, or gender identity.            After Visit Summary       This is your record. Keep this with you and show to your community pharmacist(s) and doctor(s) at your next visit.

## 2018-09-26 ASSESSMENT — ENCOUNTER SYMPTOMS
FEVER: 0
SHORTNESS OF BREATH: 1
RECTAL PAIN: 0
BLOOD IN STOOL: 1
NAUSEA: 0
APPETITE CHANGE: 0
WEAKNESS: 1
ABDOMINAL PAIN: 1
VOMITING: 0
ACTIVITY CHANGE: 0

## 2018-09-26 NOTE — ED PROVIDER NOTES
History     Chief Complaint   Patient presents with     Rectal Bleeding     HPI  Ganga Candelario is a 32 year old male who presented to the emergency room today secondary concerns of continued rectal bleeding despite having recent banding of 2 internal hemorrhoids performed last Wednesday  performed by Dr. Khoury.  He states he was told that if he had continued bleeding to call the clinic which he did this afternoon.  He stated he just wanted to make an appointment to be rechecked by his surgeon.  He states that he ended up talking to a nurse and because he told her that he was having rectal bleeding she told me needed to go to the emergency room immediately and be evaluated.  Patient stated he did not think he needed to be seen in the ER as he reports no significant worsening of his symptoms despite the continued bleeding specifically denied chest pain, lightheadedness, or dizziness.  He does state that he gets a little winded when he exerts himself but it is not changed since prior to the surgery.  He is concerned that he is continuing to have bleeding despite the fact that the hemorrhoids were banded.  He denies any significant pain to the rectal area.  He denies any fever or chills.    Problem List:    Patient Active Problem List    Diagnosis Date Noted     External hemorrhoids 09/17/2018     Priority: Medium     Anemia due to blood loss, acute 09/17/2018     Priority: Medium        Past Medical History:    History reviewed. No pertinent past medical history.    Past Surgical History:    History reviewed. No pertinent surgical history.    Family History:    No family history on file.    Social History:  Marital Status:   [2]  Social History   Substance Use Topics     Smoking status: Never Smoker     Smokeless tobacco: Never Used     Alcohol use 0.6 oz/week     1 Glasses of wine per week      Comment: very seldom        Medications:      psyllium (METAMUCIL) 58.6 % POWD         Review of Systems    Constitutional: Negative for activity change, appetite change and fever.   Respiratory: Positive for shortness of breath (With exertion.).    Gastrointestinal: Positive for abdominal pain (Patient reports he has just very mild cramping type pains in the suprapubic area of his abdomen.  These pains are not constant) and blood in stool. Negative for nausea, rectal pain and vomiting.   Neurological: Positive for weakness (With exertion and generalized.).   All other systems reviewed and are negative.      Physical Exam   BP: 140/68  Pulse: 90  Temp: 97.5  F (36.4  C)  Resp: 18  SpO2: 100 %      Physical Exam   Constitutional: He appears well-developed and well-nourished. No distress.   Abdominal: Soft. Bowel sounds are normal. He exhibits no distension. There is no tenderness. There is no rebound and no guarding.   Genitourinary: Rectal exam shows external hemorrhoid. Rectal exam shows no tenderness and anal tone normal.   Genitourinary Comments: Patient with multiple small external hemorrhoids.  Nonthrombosed hemorrhoid noted.  No active bleeding at the time of exam.   Skin: No rash noted. He is not diaphoretic. No erythema. There is pallor.   Nursing note and vitals reviewed.      ED Course     ED Course     Procedures               Critical Care time:  none               Results for orders placed or performed during the hospital encounter of 09/25/18 (from the past 24 hour(s))   CBC with platelets differential   Result Value Ref Range    WBC 6.3 4.0 - 11.0 10e9/L    RBC Count 3.16 (L) 4.4 - 5.9 10e12/L    Hemoglobin 9.4 (L) 13.3 - 17.7 g/dL    Hematocrit 29.2 (L) 40.0 - 53.0 %    MCV 92 78 - 100 fl    MCH 29.7 26.5 - 33.0 pg    MCHC 32.2 31.5 - 36.5 g/dL    RDW 13.2 10.0 - 15.0 %    Platelet Count 220 150 - 450 10e9/L    Diff Method Automated Method     % Neutrophils 51.5 %    % Lymphocytes 33.4 %    % Monocytes 12.6 %    % Eosinophils 1.4 %    % Basophils 0.6 %    % Immature Granulocytes 0.5 %    Nucleated RBCs 0  0 /100    Absolute Neutrophil 3.2 1.6 - 8.3 10e9/L    Absolute Lymphocytes 2.1 0.8 - 5.3 10e9/L    Absolute Monocytes 0.8 0.0 - 1.3 10e9/L    Absolute Basophils 0.0 0.0 - 0.2 10e9/L    Abs Immature Granulocytes 0.0 0 - 0.4 10e9/L    Absolute Nucleated RBC 0.0        Assessments & Plan (with Medical Decision Making)  32-year-old male to the emergency room secondary concerns of continued rectal bleeding following a recent hemorrhoidal banding procedure performed by the surgical department and Dr. Khoury.  Patient stated that he called the clinic just to make a follow-up appointment with the surgeon but because he told her that he was having some rectal bleeding they demanded that he be seen in the emergency room.  Patient states that he feels fine except for the continued bleeding other than mild weakness with exertion.  Hemoglobin was checked today and shows that he has dropped from his 10.1 level a week ago to 9.4 today.  There was no evidence for active bleeding rectally at the time of his visit in the ER.  Patient was scheduled to see Dr. Khoury on October 3 as this was the earliest available appointment.  I did send Dr. Khoury my message through the epic EMR stating that the patient was seen in the emergency room today and suggested he might want to or come in sooner than later to his clinic for recheck given the continued bleeding.  Patient felt comfortable with this plan to follow-up with Dr. Khoury and agreed to return should he have any increased symptoms such as shortness of breath, lightheadedness, increase aggressiveness of the rectal bleeding, or any blood with emesis.  Patient states that he already has iron tablets to supplement his iron stores.       I have reviewed the nursing notes.    I have reviewed the findings, diagnosis, plan and need for follow up with the patient.       Discharge Medication List as of 9/25/2018  9:43 PM               I verbally discussed the findings of the  evaluation today in the ER. I have verbally discussed with Ganga the suggested treatment(s) as described in the discharge instructions and handouts. I have prescribed the above listed medications and instructed him on appropriate use of these medications.      I have verbally suggested he follow-up in his clinic or return to the ER for increased symptoms. See the follow-up recommendations documented  in the after visit summary in this visit's EPIC chart.      Final diagnoses:   Hemorrhage of rectum and anus   Anemia due to blood loss, acute   External hemorrhoids - mild, non-inflammed   Internal hemorrhoids       9/25/2018   [facility] EMERGENCY DEPARTMENT     Chico Nguyen,   09/26/18 0650

## 2018-09-26 NOTE — DISCHARGE INSTRUCTIONS
He was scheduled to see Dr. Khoury on October 3 at his first available appointment.  I did send a message through our computer system to Dr. Khoury telling him that you could not be seen until October 3 and that he should see you sooner than that and to contact you directly.    Electronically signed, Chico Nguyen DO

## 2018-09-26 NOTE — TELEPHONE ENCOUNTER
"  Reason for Disposition    SEVERE rectal bleeding (large blood clots; on and off, or constant bleeding)    Additional Information    Negative: Shock suspected (e.g., cold/pale/clammy skin, too weak to stand, low BP, rapid pulse)    Negative: Difficult to awaken or acting confused  (e.g., disoriented, slurred speech)    Negative: Passed out (i.e., lost consciousness, collapsed and was not responding)    Negative: [1] Vomiting AND [2] contains red blood or black (\"coffee ground\") material  (Exception: few red streaks in vomit that only happened once)    Negative: Sounds like a life-threatening emergency to the triager    Negative: Diarrhea is main symptom    Negative: Stool color other than brown or tan is main concern  (no bleeding and no melena)    Protocols used: RECTAL BLEEDING-ADULT-JOSE Schuler calls and says that 1 week ago, he saw his DrAlbert, for his rectal bleeding. Pt. Was found to be anemic, also. Pt. Calls this richard and says that he is still rectally bleeding. Pt. Will go to the ER now.  "

## 2018-09-26 NOTE — ED TRIAGE NOTES
Pt had 2 hemorrhoids banded last Wednesday and continues to have rectal bleeding, reports feeling weak and reports hemoglobin was  10 at that time

## 2018-10-03 ENCOUNTER — OFFICE VISIT (OUTPATIENT)
Dept: SURGERY | Facility: CLINIC | Age: 33
End: 2018-10-03
Payer: COMMERCIAL

## 2018-10-03 VITALS
BODY MASS INDEX: 18.99 KG/M2 | WEIGHT: 148 LBS | DIASTOLIC BLOOD PRESSURE: 68 MMHG | HEIGHT: 74 IN | TEMPERATURE: 97.4 F | SYSTOLIC BLOOD PRESSURE: 126 MMHG

## 2018-10-03 DIAGNOSIS — K64.8 BLEEDING INTERNAL HEMORRHOIDS: Primary | ICD-10-CM

## 2018-10-03 PROCEDURE — 46221 LIGATION OF HEMORRHOID(S): CPT | Performed by: SURGERY

## 2018-10-03 NOTE — MR AVS SNAPSHOT
"              After Visit Summary   10/3/2018    Ganga Candelario    MRN: [id number]           Patient Information     Date Of Birth          1985        Visit Information        Provider Department      10/3/2018 8:30 AM Danilo Khoury, DO [facility]        Today's Diagnoses     Bleeding internal hemorrhoids    -  1       Follow-ups after your visit        Who to contact     If you have questions or need follow up information about today's clinic visit or your schedule please contact [facility] directly at [phone].  Normal or non-critical lab and imaging results will be communicated to you by MyChart, letter or phone within 4 business days after the clinic has received the results. If you do not hear from us within 7 days, please contact the clinic through MyChart or phone. If you have a critical or abnormal lab result, we will notify you by phone as soon as possible.  Submit refill requests through [facility] [employer] or call your pharmacy and they will forward the refill request to us. Please allow 3 business days for your refill to be completed.          Additional Information About Your Visit        Care EveryWhere ID     This is your Care EveryWhere ID. This could be used by other organizations to access your [facility] medical records  [id number]        Your Vitals Were     Temperature Height BMI (Body Mass Index)             97.4  F (36.3  C) (Temporal) 1.88 m (6' 2\") 19 kg/m2          Blood Pressure from Last 3 Encounters:   10/03/18 126/68   09/25/18 140/68   09/19/18 122/68    Weight from Last 3 Encounters:   10/03/18 67.1 kg (148 lb)   09/19/18 68 kg (150 lb)   09/17/18 67.6 kg (149 lb)              Today, you had the following     No orders found for display       Primary Care Provider Office Phone # Fax #    [facility] [phone] [address] [address]        Equal Access to Services     CHRISTINA JOHNSTON AH: Elicia hoff " juan carlos Weber, sol mcclendonadaha, qayenyta kamargaritada pathi, liat farrahin hayaavictor manuel stewartpolly jonnaabrahan laPinkyalban sharita. So [facility] [phone].    ATENCIÓN: Si habla español, tiene a duckworth disposición servicios gratuitos de asistencia lingüística. Keisha al [phone].    We comply with applicable federal civil rights laws and [place] laws. We do not discriminate on the basis of race, color, national origin, age, disability, sex, sexual orientation, or gender identity.            Thank you!     Thank you for choosing [facility]  for your care. Our goal is always to provide you with excellent care. Hearing back from our patients is one way we can continue to improve our services. Please take a few minutes to complete the written survey that you may receive in the mail after your visit with us. Thank you!             Your Updated Medication List - Protect others around you: Learn how to safely use, store and throw away your medicines at www.disposemymeds.org.          This list is accurate as of 10/3/18 10:43 AM.  Always use your most recent med list.                   Brand Name Dispense Instructions for use Diagnosis    psyllium 58.6 % Powd    METAMUCIL     Take by mouth daily

## 2018-10-03 NOTE — LETTER
10/3/2018         RE: Ganga Candelario  [address]  [place] [address]        Dear Colleague,    Thank you for referring your patient, Ganga Candelario, to the [facility]. Please see a copy of my visit note below.    PROCEDURE: internal hemorrhoid banding x 2   Written consent was obtained    Using the usual technique, suction internal hemorrhoid banding was performed. Two separate hemorrhoid columns were isolated and banded using the suction device at the left anterior and right lateral positions. No pain after placement of bands. Minor bleeding from the speculum exam. The procedure was well tolerated and without complications.      Again, thank you for allowing me to participate in the care of your patient.        Sincerely,        Danilo Khoury, DO

## 2018-10-03 NOTE — PROGRESS NOTES
PROCEDURE: internal hemorrhoid banding x 2   Written consent was obtained    Using the usual technique, suction internal hemorrhoid banding was performed. Two separate hemorrhoid columns were isolated and banded using the suction device at the left anterior and right lateral positions. No pain after placement of bands. Minor bleeding from the speculum exam. The procedure was well tolerated and without complications.

## 2019-04-12 ENCOUNTER — OFFICE VISIT (OUTPATIENT)
Dept: FAMILY MEDICINE | Facility: OTHER | Age: 34
End: 2019-04-12
Payer: COMMERCIAL

## 2019-04-12 VITALS
WEIGHT: 151.4 LBS | BODY MASS INDEX: 20.06 KG/M2 | HEART RATE: 90 BPM | HEIGHT: 73 IN | TEMPERATURE: 98.4 F | DIASTOLIC BLOOD PRESSURE: 80 MMHG | RESPIRATION RATE: 16 BRPM | OXYGEN SATURATION: 98 % | SYSTOLIC BLOOD PRESSURE: 134 MMHG

## 2019-04-12 DIAGNOSIS — L30.9 ECZEMA, UNSPECIFIED TYPE: Primary | ICD-10-CM

## 2019-04-12 PROCEDURE — 99213 OFFICE O/P EST LOW 20 MIN: CPT | Performed by: PHYSICIAN ASSISTANT

## 2019-04-12 RX ORDER — MUPIROCIN CALCIUM 20 MG/G
CREAM TOPICAL DAILY
Qty: 30 G | Refills: 0 | Status: SHIPPED | OUTPATIENT
Start: 2019-04-12 | End: 2019-07-09

## 2019-04-12 ASSESSMENT — MIFFLIN-ST. JEOR: SCORE: 1674.24

## 2019-07-08 NOTE — PROGRESS NOTES
"Subjective     Ganga Candelario is a 33 year old male who presents to clinic today for the following health issues:    HPI   Bug bites x 1 week legs and arms- itch    Patient presents today for evaluation of bug bites that have been occurring over the last week. He reports mostly on the arms and legs. Large, red welts. Itchy. Lasts 3-4 days then resolves. Other family members with bites. Unsure what it is. Patient concerned it may be bed bugs or scabies. No exposures.     Patient Active Problem List   Diagnosis     External hemorrhoids     Anemia due to blood loss, acute     History reviewed. No pertinent surgical history.    Social History     Tobacco Use     Smoking status: Never Smoker     Smokeless tobacco: Never Used   Substance Use Topics     Alcohol use: Yes     Alcohol/week: 0.6 oz     Types: 1 Glasses of wine per week     Comment: very seldom     No family history on file.      Current Outpatient Medications   Medication Sig Dispense Refill     psyllium (METAMUCIL) 58.6 % POWD Take by mouth daily       triamcinolone (KENALOG) 0.1 % external cream        No Known Allergies  BP Readings from Last 3 Encounters:   07/09/19 122/78   04/12/19 134/80   10/03/18 126/68    Wt Readings from Last 3 Encounters:   07/09/19 68.9 kg (152 lb)   04/12/19 68.7 kg (151 lb 6.4 oz)   10/03/18 67.1 kg (148 lb)                    Reviewed and updated as needed this visit by Provider  Allergies  Meds  Problems  Med Hx  Surg Hx         Review of Systems   ROS COMP: Constitutional, HEENT, cardiovascular, pulmonary, gi and gu systems are negative, except as otherwise noted.      Objective    /78   Pulse 80   Temp 98.4  F (36.9  C) (Temporal)   Resp 16   Ht 1.867 m (6' 1.5\")   Wt 68.9 kg (152 lb)   SpO2 98%   BMI 19.78 kg/m    Body mass index is 19.78 kg/m .  Physical Exam   GENERAL: healthy, alert and no distress  SKIN: few larger, erythematous welts present on arms and legs. Eczematous changes on fingers of right " hand.   PSYCH: mentation appears normal, affect normal/bright    Diagnostic Test Results:  Labs reviewed in Epic  none         Assessment & Plan     1. Bug bite, initial encounter  Discussed with patient that bites not concerning for bed bugs or scabies. They appear likely related to mosquitos or gnats. Discussed OTC benadryl cream or tablets as needed. Patient will follow-up in clinic if new symptoms develop or current symptoms fail to improve.    2. Eczema, unspecified type  Patient has steroid cream at home. Was unsure how to use this. Instructions reviewed with patient today.     The patient indicates understanding of these issues and agrees with the plan.    Britt Acosta PA-C  [facility]

## 2019-07-09 ENCOUNTER — OFFICE VISIT (OUTPATIENT)
Dept: FAMILY MEDICINE | Facility: OTHER | Age: 34
End: 2019-07-09
Payer: COMMERCIAL

## 2019-07-09 VITALS
HEART RATE: 80 BPM | BODY MASS INDEX: 19.51 KG/M2 | WEIGHT: 152 LBS | RESPIRATION RATE: 16 BRPM | OXYGEN SATURATION: 98 % | TEMPERATURE: 98.4 F | HEIGHT: 74 IN | SYSTOLIC BLOOD PRESSURE: 122 MMHG | DIASTOLIC BLOOD PRESSURE: 78 MMHG
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
## 2019-07-09 DIAGNOSIS — L30.9 ECZEMA, UNSPECIFIED TYPE: ICD-10-CM

## 2019-07-09 PROCEDURE — 99213 OFFICE O/P EST LOW 20 MIN: CPT | Performed by: PHYSICIAN ASSISTANT

## 2019-07-09 RX ORDER — TRIAMCINOLONE ACETONIDE 1 MG/G
CREAM TOPICAL
COMMUNITY
Start: 2019-04-12

## 2019-07-09 ASSESSMENT — MIFFLIN-ST. JEOR: SCORE: 1691.28

## 2019-07-09 ASSESSMENT — PAIN SCALES - GENERAL: PAINLEVEL: NO PAIN (0)

## 2020-01-28 ENCOUNTER — NURSE TRIAGE (OUTPATIENT)
Dept: FAMILY MEDICINE | Facility: OTHER | Age: 35
End: 2020-01-28

## 2020-01-28 ENCOUNTER — OFFICE VISIT (OUTPATIENT)
Dept: FAMILY MEDICINE | Facility: OTHER | Age: 35
End: 2020-01-28
Payer: COMMERCIAL

## 2020-01-28 VITALS
OXYGEN SATURATION: 97 % | RESPIRATION RATE: 14 BRPM | SYSTOLIC BLOOD PRESSURE: 110 MMHG | HEIGHT: 74 IN | WEIGHT: 154.2 LBS | HEART RATE: 102 BPM | DIASTOLIC BLOOD PRESSURE: 76 MMHG | BODY MASS INDEX: 19.79 KG/M2 | TEMPERATURE: 100.8 F

## 2020-01-28 DIAGNOSIS — R50.9 FEVER, UNSPECIFIED FEVER CAUSE: ICD-10-CM

## 2020-01-28 DIAGNOSIS — J10.1 INFLUENZA B: Primary | ICD-10-CM

## 2020-01-28 LAB
FLUAV+FLUBV AG SPEC QL: NEGATIVE
FLUAV+FLUBV AG SPEC QL: POSITIVE
SPECIMEN SOURCE: ABNORMAL

## 2020-01-28 PROCEDURE — 87804 INFLUENZA ASSAY W/OPTIC: CPT | Performed by: PHYSICIAN ASSISTANT

## 2020-01-28 PROCEDURE — 99213 OFFICE O/P EST LOW 20 MIN: CPT | Performed by: PHYSICIAN ASSISTANT

## 2020-01-28 RX ORDER — MUPIROCIN CALCIUM 20 MG/G
CREAM TOPICAL
COMMUNITY
Start: 2019-04-12

## 2020-01-28 ASSESSMENT — MIFFLIN-ST. JEOR: SCORE: 1704.45

## 2020-01-28 NOTE — PROGRESS NOTES
"Subjective     Ganga Candelario is a 34 year old male who presents to clinic today for the following health issues:    HPI   Acute Illness   Acute illness concerns: fever, cough, nausea, body aches   Onset: since Saturday, 4 days.     Fever: YES    Chills/Sweats: YES    Headache (location?): YES- just a pain in head, not really a headache    Sinus Pressure:no    Conjunctivitis:  no    Ear Pain: no    Rhinorrhea: YES    Congestion: YES    Sore Throat: YES- minor but has large doing with cough      Cough: YES-productive of yellow sputum, cannot take a deep breath-not so much shortness of breath. Worse at night.     Wheeze: no     Decreased Appetite: YES    Nausea: YES    Abdominal pain: yes    Vomiting: YES- but only dry heaving.     Diarrhea:  YES- once    Dysuria/Freq.: no    Fatigue/Achiness: YES- body aches, fatigue/lethargic    Sick/Strep Exposure: YES- daughter has a cold but no fever, not much for similar symptoms      Therapies Tried and outcome: none, he states normally he tries to \"ride it out\" as long as he can.     Woke up at 3 AM on Saturday to fevers and chills.  Up to 101.  Been consistent fever.   Mild cold symptoms initially and in the last day or two has had more congestion and cough.   Having central abdominal pain, \"colon area\", feels like cramping, has been there mainly with bowel movements, more so earlier on when nauseated but not as much.  Last night had BM and had nausea and intense pain.  Nausea is improving, comes and goes.  Now can eat without problem.  Urinating normally.  No blood in stool.  Dry heaves only on Saturday.  No hematemesis.  No new foods or raw foods.     Current Outpatient Medications   Medication Sig Dispense Refill     mupirocin (BACTROBAN) 2 % external cream        psyllium (METAMUCIL) 58.6 % POWD Take by mouth daily       triamcinolone (KENALOG) 0.1 % external cream        No Known Allergies    Reviewed and updated as needed this visit by Provider  Tobacco  Allergies  " "Meds  Problems  Med Hx  Surg Hx  Fam Hx         Review of Systems   ROS COMP: Constitutional, HEENT, cardiovascular, pulmonary, gi and gu systems are negative, except as otherwise noted.      Objective    /76   Pulse 102   Temp 100.8  F (38.2  C) (Temporal)   Resp 14   Ht 1.88 m (6' 2.02\")   Wt 69.9 kg (154 lb 3.2 oz)   SpO2 97%   BMI 19.79 kg/m    Body mass index is 19.79 kg/m .  Physical Exam   GENERAL: alert and no distress  EYES: Eyes grossly normal to inspection, PERRL and conjunctivae and sclerae normal  HENT: ear canals and TM's normal, nose and mouth without ulcers or lesions  NECK: no adenopathy, no asymmetry, masses, or scars and thyroid normal to palpation  RESP: lungs clear to auscultation - no rales, rhonchi or wheezes  CV: regular rate and rhythm, normal S1 S2, no S3 or S4, no murmur, click or rub, no peripheral edema and peripheral pulses strong  ABDOMEN: soft, nontender, no hepatosplenomegaly, no masses and bowel sounds normal  MS: no gross musculoskeletal defects noted, no edema  SKIN: no suspicious lesions or rashes    Diagnostic Test Results:  Labs reviewed in Epic  Results for orders placed or performed in visit on 01/28/20 (from the past 24 hour(s))   Influenza A/B antigen   Result Value Ref Range    Influenza A/B Agn Specimen Nasal     Influenza A Negative NEG^Negative    Influenza B Positive (A) NEG^Negative           Assessment & Plan       ICD-10-CM    1. Influenza B J10.1    2. Fever, unspecified fever cause R50.9 Influenza A/B antigen          Symptoms and exam findings are consistent with influenza B.  His abdominal pain has subsided and now having normal bowel movements and nausea has been resolving which is reassuring.  Did discuss strep given fevers, sore throat, upset stomach but his examination was benign and he was positive for influenza, and his stomach symptoms were improving.  Consider in the future if symptoms persist.  Discuss indications for Tamiflu, would " not recommend given he is 4 days into symptoms, he agrees.  Encouraged tylenol/ibuprofen to help with fevers and aches.  Encouraged to stay hydrated and rest.  Discussed complications of influenza and symptoms to monitor for.     Return in about 3 days (around 1/31/2020) for If not improving, sooner if worse or new concerns. Encouraged physical in the near future.     Options for treatment and follow-up care were reviewed with the patient and/or guardian. Patient and/or guardian engaged in the decision making process and verbalized understanding of the options discussed and agreed with the final plan.      Mike Rosas PA-C  [facility]

## 2020-01-28 NOTE — PATIENT INSTRUCTIONS
You are influenza B positive.     Stay hydrated.   Advance diet as you're able.   Tylenol 500 mg every 4-6 hours and/or Ibuprofen 600 mg every 6-8 hours as needed.     Monitor for worsening symptoms.  Typically expect to start improving around a week out. If not improving or getting worse recommend follow-up in clinic.  Especially if any chest pain, shortness of breath, persistent fever, fevers that are getting higher.

## 2020-01-28 NOTE — TELEPHONE ENCOUNTER
Reason for call:  Patient reporting a symptom    Symptom or request: flu sx, abdominal pain    Duration (how long have symptoms been present): 4 days    Have you been treated for this before? No    Additional comments: Patient called wanting to be seen for flu sx, fever, congestion, and abdominal pain. Sent to triage.     Phone Number patient can be reached at:  Home number on file [phone] (home)    Best Time:  Any      Can we leave a detailed message on this number:  YES    Call taken on 1/28/2020 at 9:31 AM by Renzo Francisco

## 2020-01-28 NOTE — TELEPHONE ENCOUNTER
Spoke to patient.     States started early saturday morning.   Abdominal pain, nausea, congestion.   Fever for greater than 4 days.   Temp anywhere from 99 to 101.   Chills, shivers.   Some nausea.     Abdominal pain not feeling it right now.   Had 2 bowel movements since being ill.   Mostly sticky and thick stools. Hard to get out.   Abdominal pain worse with bowel movements.   Has subsided some.   Pain 0/10, at worst 5-6/10 at worse. Lasted for about an hour.   Able to keep liquids and some food down.     Cough is mild and so is runny nose.   Cough is somewhat productive.   No sinus pain.   Feels like he can't take supper deep breaths but doesn't feel he is breathing shallowly either.     Home cares:   Rest, lots of fluids, eating bland food in small amounts.   No tylenol for fevers.     Patient would like to be seen.     Next 5 appointments (look out 90 days)    Jan 28, 2020  2:00 PM CST  Office Visit with Mike Rosas PA-C  [facility] [address] [place] [phone]  [phone]        Silvia Duran RN BSN        Additional Information    Negative: Passed out (i.e., fainted, collapsed and was not responding)    Negative: Shock suspected (e.g., cold/pale/clammy skin, too weak to stand, low BP, rapid pulse)    Negative: Sounds like a life-threatening emergency to the triager    Negative: Chest pain    Negative: Pain is mainly in upper abdomen (if needed ask: 'is it mainly above the belly button?')    Negative: SEVERE abdominal pain (e.g., excruciating)    Negative: Vomiting red blood or black (coffee ground) material    Negative: Bloody, black, or tarry bowel movements    Negative: Unable to urinate (or only a few drops) and bladder feels very full    Negative: Pain in scrotum persists > 1 hour    Negative: Constant abdominal pain lasting > 2 hours    Negative: Vomiting bile (green color)    Negative: Patient sounds very sick or weak to the triager     Negative: Vomiting and abdomen looks much more swollen than usual    Negative: White of the eyes have turned yellow (i.e., jaundice)    Negative: Blood in urine (red, pink, or tea-colored)    Negative: Fever > 103 F (39.4 C)    Negative: Fever > 101 F (38.3 C) and over 60 years of age    Negative: Fever > 100.0 F (37.8 C) and has diabetes mellitus or a weak immune system (e.g., HIV positive, cancer chemotherapy, organ transplant, splenectomy, chronic steroids)    Negative: Fever > 100.0 F (37.8 C) and bedridden (e.g., nursing home patient, stroke, chronic illness, recovering from surgery)    Negative: MILD pain that comes and goes (cramps) lasts > 24 hours    Negative: Age > 60 years    Negative: Patient wants to be seen    Negative: Severe difficulty breathing (e.g., struggling for each breath, speaks in single words)    Negative: Very weak (can't stand)    Negative: Sounds like a life-threatening emergency to the triager    Negative: Runny nose is caused by pollen or other allergies    Negative: Cough is the main symptom    Negative: Sore throat is the main symptom    Negative: Patient sounds very sick or weak to the triager    Negative: Fever > 103 F (39.4 C)    Negative: Fever > 101 F (38.3 C) and over 60 years of age    Negative: Fever > 100.0 F (37.8 C) and has diabetes mellitus or a weak immune system (e.g., HIV positive, cancer chemotherapy, organ transplant, splenectomy, chronic steroids)    Negative: Fever > 100.0 F (37.8 C) and bedridden (e.g., nursing home patient, stroke, chronic illness, recovering from surgery)    Negative: Earache    Negative: Sinus pain (not just congestion) and fever    Negative: Fever returns after gone for over 24 hours and symptoms worse or not improved    Protocols used: ABDOMINAL PAIN - MALE-A-OH, COMMON COLD-A-OH

## 2020-09-02 ENCOUNTER — OFFICE VISIT (OUTPATIENT)
Dept: FAMILY MEDICINE | Facility: OTHER | Age: 35
End: 2020-09-02
Payer: COMMERCIAL

## 2020-09-02 ENCOUNTER — TELEPHONE (OUTPATIENT)
Dept: FAMILY MEDICINE | Facility: OTHER | Age: 35
End: 2020-09-02

## 2020-09-02 VITALS
HEART RATE: 120 BPM | HEIGHT: 74 IN | TEMPERATURE: 97.4 F | BODY MASS INDEX: 20.15 KG/M2 | WEIGHT: 157 LBS | SYSTOLIC BLOOD PRESSURE: 126 MMHG | RESPIRATION RATE: 16 BRPM | DIASTOLIC BLOOD PRESSURE: 84 MMHG | OXYGEN SATURATION: 95 %

## 2020-09-02 DIAGNOSIS — K64.4 EXTERNAL HEMORRHOIDS: ICD-10-CM

## 2020-09-02 DIAGNOSIS — K64.4 EXTERNAL HEMORRHOIDS: Primary | ICD-10-CM

## 2020-09-02 DIAGNOSIS — Z13.220 SCREENING FOR HYPERLIPIDEMIA: ICD-10-CM

## 2020-09-02 DIAGNOSIS — Z11.4 SCREENING FOR HIV (HUMAN IMMUNODEFICIENCY VIRUS): ICD-10-CM

## 2020-09-02 PROCEDURE — 99214 OFFICE O/P EST MOD 30 MIN: CPT | Performed by: PHYSICIAN ASSISTANT

## 2020-09-02 RX ORDER — HYDROCODONE BITARTRATE AND ACETAMINOPHEN 5; 325 MG/1; MG/1
1 TABLET ORAL EVERY 6 HOURS PRN
Qty: 18 TABLET | Refills: 0 | Status: SHIPPED | OUTPATIENT
Start: 2020-09-02 | End: 2020-09-05

## 2020-09-02 RX ORDER — PRAMOXINE HYDROCHLORIDE 10 MG/G
AEROSOL, FOAM TOPICAL
Qty: 15 G | Refills: 0 | Status: SHIPPED | OUTPATIENT
Start: 2020-09-02

## 2020-09-02 RX ORDER — HYDROCORTISONE 25 MG/G
CREAM TOPICAL 2 TIMES DAILY PRN
Qty: 30 G | Refills: 0 | Status: SHIPPED | OUTPATIENT
Start: 2020-09-02

## 2020-09-02 RX ORDER — DIBUCAINE 1 G/100G
OINTMENT TOPICAL 3 TIMES DAILY PRN
Qty: 60 G | Refills: 0 | Status: SHIPPED | OUTPATIENT
Start: 2020-09-02

## 2020-09-02 ASSESSMENT — MIFFLIN-ST. JEOR: SCORE: 1717.15

## 2020-09-02 NOTE — PROGRESS NOTES
Subjective     Ganga Candelario is a 34 year old male who presents to clinic today for the following health issues:    History of Present Illness        He eats 4 or more servings of fruits and vegetables daily.He consumes 1 sweetened beverage(s) daily.He exercises with enough effort to increase his heart rate 20 to 29 minutes per day.  He exercises with enough effort to increase his heart rate 4 days per week.   He is taking medications regularly.     Hemorrhoids  Onset/Duration: a couple of days.   Description:   Winsome-anal lump: YES  Pain: YES, constant and some times it is more excruciating than others.   Itching: YES- a little bit.   Accompanying Signs & Symptoms:  Blood in stool: no, but the hemorrhoids have been bleeding.  Changes in stool pattern: no  History:   Any previous GI studies done: none  Family History of colon cancer: YES- his father, aunt, grandpa  Has had them before, has been seen for this before.   Precipitating factors:   Sitting, walking sometimes.   Alleviating factors:  None  Therapies tried and outcome: lidocream, witch hazel wipes, something similar/generic of preparation H, ibuprofen     Review of Systems   Constitutional, HEENT, cardiovascular, pulmonary, GI, , musculoskeletal, neuro, skin, endocrine and psych systems are negative, except as otherwise noted.      Objective    There were no vitals taken for this visit.  There is no height or weight on file to calculate BMI.  Physical Exam   GENERAL: healthy, alert and no distress  RESP: lungs clear to auscultation - no rales, rhonchi or wheezes  CV: regular rate and rhythm, normal S1 S2, no S3 or S4, no murmur, click or rub, no peripheral edema and peripheral pulses strong  ABDOMEN: soft, nontender, no hepatosplenomegaly, no masses and bowel sounds normal  RECTAL (male): Multiple external hemorrhoids noted with what I can only describe as a thrombosed hemorrhoid at its center.  This honestly looks like half of a rosebud with surrounding  petals of hemorrhoids.  I think that these are more right-sided than left-sided today.  Very difficult to tell.  MS: no gross musculoskeletal defects noted, no edema  NEURO: Normal strength and tone, mentation intact and speech normal  PSYCH: anxious and fatigued          Assessment & Plan     Screening for HIV (human immunodeficiency virus)  Screening for hyperlipidemia  External hemorrhoids  Declines screening for the first 2 problems.  He will need surgical intervention for his external hemorrhoids.  Did place a call to general surgeon for consideration of a work and yet this week.  Main exception to my usual approach of no narcotics for hemorrhoids in this instance.  No refills without an office visit however.  - GENERAL SURG ADULT REFERRAL; Future  - HYDROcodone-acetaminophen (NORCO) 5-325 MG tablet; Take 1 tablet by mouth every 6 hours as needed for pain  - pramoxine (PROCTOFOAM) 1 % foam; Place rectally every 2 hours as needed for hemorrhoids     Return in about 1 week (around 9/9/2020) for recheck of current condition, if symptoms do not improve.    Chavez Neal PA-C  [facility]

## 2020-09-02 NOTE — TELEPHONE ENCOUNTER
[facility] calling.  She states they do not have the pramoxine foam and are unable to get it. She states she could recommend an OTC cream for him or check to see if Chavez Obregon has another medication in mind.  Patient is at the pharmacy now.  Thank you.

## 2020-09-03 ENCOUNTER — HOSPITAL ENCOUNTER (OUTPATIENT)
Facility: CLINIC | Age: 35
Discharge: HOME | End: 2020-09-03
Attending: SPECIALIST | Admitting: SPECIALIST
Payer: COMMERCIAL

## 2020-09-03 ENCOUNTER — OFFICE VISIT (OUTPATIENT)
Dept: SURGERY | Facility: CLINIC | Age: 35
End: 2020-09-03
Payer: COMMERCIAL

## 2020-09-03 ENCOUNTER — ANESTHESIA EVENT (OUTPATIENT)
Dept: SURGERY | Facility: CLINIC | Age: 35
End: 2020-09-03
Payer: COMMERCIAL

## 2020-09-03 ENCOUNTER — ANESTHESIA (OUTPATIENT)
Dept: SURGERY | Facility: CLINIC | Age: 35
End: 2020-09-03
Payer: COMMERCIAL

## 2020-09-03 VITALS
DIASTOLIC BLOOD PRESSURE: 78 MMHG | HEART RATE: 66 BPM | RESPIRATION RATE: 16 BRPM | TEMPERATURE: 98.2 F | OXYGEN SATURATION: 100 % | SYSTOLIC BLOOD PRESSURE: 133 MMHG

## 2020-09-03 VITALS
HEART RATE: 77 BPM | WEIGHT: 158.8 LBS | SYSTOLIC BLOOD PRESSURE: 132 MMHG | BODY MASS INDEX: 20.38 KG/M2 | DIASTOLIC BLOOD PRESSURE: 82 MMHG

## 2020-09-03 DIAGNOSIS — K64.5 THROMBOSED EXTERNAL HEMORRHOID: ICD-10-CM

## 2020-09-03 DIAGNOSIS — Z01.818 PRE-OP TESTING: Primary | ICD-10-CM

## 2020-09-03 DIAGNOSIS — Z20.822 COVID-19 RULED OUT: Primary | ICD-10-CM

## 2020-09-03 LAB
LABORATORY COMMENT REPORT: NORMAL
SARS-COV-2 RNA SPEC QL NAA+PROBE: NEGATIVE
SARS-COV-2 RNA SPEC QL NAA+PROBE: NORMAL
SPECIMEN SOURCE: NORMAL
SPECIMEN SOURCE: NORMAL

## 2020-09-03 PROCEDURE — 37000009 ZZH ANESTHESIA TECHNICAL FEE, EACH ADDTL 15 MIN: Performed by: SPECIALIST

## 2020-09-03 PROCEDURE — 25000125 ZZHC RX 250: Performed by: SPECIALIST

## 2020-09-03 PROCEDURE — 71000027 ZZH RECOVERY PHASE 2 EACH 15 MINS: Performed by: SPECIALIST

## 2020-09-03 PROCEDURE — 37000008 ZZH ANESTHESIA TECHNICAL FEE, 1ST 30 MIN: Performed by: SPECIALIST

## 2020-09-03 PROCEDURE — 36000044 ZZH SURGERY LEVEL 1 1ST 30 MIN: Performed by: SPECIALIST

## 2020-09-03 PROCEDURE — 25000125 ZZHC RX 250: Performed by: NURSE ANESTHETIST, CERTIFIED REGISTERED

## 2020-09-03 PROCEDURE — U0003 INFECTIOUS AGENT DETECTION BY NUCLEIC ACID (DNA OR RNA); SEVERE ACUTE RESPIRATORY SYNDROME CORONAVIRUS 2 (SARS-COV-2) (CORONAVIRUS DISEASE [COVID-19]), AMPLIFIED PROBE TECHNIQUE, MAKING USE OF HIGH THROUGHPUT TECHNOLOGIES AS DESCRIBED BY CMS-2020-01-R: HCPCS

## 2020-09-03 PROCEDURE — 25000132 ZZH RX MED GY IP 250 OP 250 PS 637: Performed by: SPECIALIST

## 2020-09-03 PROCEDURE — 46083 INC THROMBOSED HROID XTRNL: CPT | Performed by: SPECIALIST

## 2020-09-03 PROCEDURE — 36000046 ZZH SURGERY LEVEL 1 EA 15 ADDTL MIN: Performed by: SPECIALIST

## 2020-09-03 PROCEDURE — 25000128 H RX IP 250 OP 636: Performed by: NURSE ANESTHETIST, CERTIFIED REGISTERED

## 2020-09-03 PROCEDURE — 25800030 ZZH RX IP 258 OP 636: Performed by: NURSE ANESTHETIST, CERTIFIED REGISTERED

## 2020-09-03 PROCEDURE — 40000306 ZZH STATISTIC PRE PROC ASSESS II: Performed by: SPECIALIST

## 2020-09-03 PROCEDURE — 99213 OFFICE O/P EST LOW 20 MIN: CPT | Mod: 57 | Performed by: SPECIALIST

## 2020-09-03 RX ORDER — METOPROLOL TARTRATE 1 MG/ML
1-2 INJECTION, SOLUTION INTRAVENOUS EVERY 5 MIN PRN
Status: CANCELLED | OUTPATIENT
Start: 2020-09-03

## 2020-09-03 RX ORDER — ONDANSETRON 4 MG/1
4 TABLET, ORALLY DISINTEGRATING ORAL EVERY 30 MIN PRN
Status: DISCONTINUED | OUTPATIENT
Start: 2020-09-03 | End: 2020-09-03 | Stop reason: HOSPADM

## 2020-09-03 RX ORDER — HYDROMORPHONE HYDROCHLORIDE 1 MG/ML
.3-.5 INJECTION, SOLUTION INTRAMUSCULAR; INTRAVENOUS; SUBCUTANEOUS EVERY 10 MIN PRN
Status: DISCONTINUED | OUTPATIENT
Start: 2020-09-03 | End: 2020-09-03 | Stop reason: HOSPADM

## 2020-09-03 RX ORDER — PROPOFOL 10 MG/ML
INJECTION, EMULSION INTRAVENOUS PRN
Status: DISCONTINUED | OUTPATIENT
Start: 2020-09-03 | End: 2020-09-03

## 2020-09-03 RX ORDER — FENTANYL CITRATE 50 UG/ML
INJECTION, SOLUTION INTRAMUSCULAR; INTRAVENOUS PRN
Status: DISCONTINUED | OUTPATIENT
Start: 2020-09-03 | End: 2020-09-03

## 2020-09-03 RX ORDER — CEFOTETAN DISODIUM 1 G/10ML
1 INJECTION, POWDER, FOR SOLUTION INTRAMUSCULAR; INTRAVENOUS
Status: COMPLETED | OUTPATIENT
Start: 2020-09-03 | End: 2020-09-03

## 2020-09-03 RX ORDER — LIDOCAINE 40 MG/G
CREAM TOPICAL
Status: DISCONTINUED | OUTPATIENT
Start: 2020-09-03 | End: 2020-09-03 | Stop reason: HOSPADM

## 2020-09-03 RX ORDER — HYDRALAZINE HYDROCHLORIDE 20 MG/ML
2.5-5 INJECTION INTRAMUSCULAR; INTRAVENOUS EVERY 10 MIN PRN
Status: CANCELLED | OUTPATIENT
Start: 2020-09-03

## 2020-09-03 RX ORDER — FENTANYL CITRATE 50 UG/ML
25-50 INJECTION, SOLUTION INTRAMUSCULAR; INTRAVENOUS
Status: DISCONTINUED | OUTPATIENT
Start: 2020-09-03 | End: 2020-09-03 | Stop reason: HOSPADM

## 2020-09-03 RX ORDER — HYDROCORTISONE 25 MG/G
CREAM TOPICAL 2 TIMES DAILY
Qty: 30 G | Refills: 3 | Status: SHIPPED | OUTPATIENT
Start: 2020-09-03

## 2020-09-03 RX ORDER — PROPOFOL 10 MG/ML
INJECTION, EMULSION INTRAVENOUS CONTINUOUS PRN
Status: DISCONTINUED | OUTPATIENT
Start: 2020-09-03 | End: 2020-09-03

## 2020-09-03 RX ORDER — FENTANYL CITRATE 50 UG/ML
25-50 INJECTION, SOLUTION INTRAMUSCULAR; INTRAVENOUS
Status: CANCELLED | OUTPATIENT
Start: 2020-09-03

## 2020-09-03 RX ORDER — CEFOTETAN DISODIUM 1 G/10ML
1 INJECTION, POWDER, FOR SOLUTION INTRAMUSCULAR; INTRAVENOUS SEE ADMIN INSTRUCTIONS
Status: DISCONTINUED | OUTPATIENT
Start: 2020-09-03 | End: 2020-09-03 | Stop reason: HOSPADM

## 2020-09-03 RX ORDER — MEPERIDINE HYDROCHLORIDE 25 MG/ML
25-50 INJECTION INTRAMUSCULAR; INTRAVENOUS; SUBCUTANEOUS
Status: DISCONTINUED | OUTPATIENT
Start: 2020-09-03 | End: 2020-09-03 | Stop reason: HOSPADM

## 2020-09-03 RX ORDER — HYDROCODONE BITARTRATE AND ACETAMINOPHEN 5; 325 MG/1; MG/1
1 TABLET ORAL
Status: COMPLETED | OUTPATIENT
Start: 2020-09-03 | End: 2020-09-03

## 2020-09-03 RX ORDER — LIDOCAINE HYDROCHLORIDE AND EPINEPHRINE 10; 10 MG/ML; UG/ML
INJECTION, SOLUTION INFILTRATION; PERINEURAL PRN
Status: DISCONTINUED | OUTPATIENT
Start: 2020-09-03 | End: 2020-09-03 | Stop reason: HOSPADM

## 2020-09-03 RX ORDER — MEPERIDINE HYDROCHLORIDE 25 MG/ML
12.5 INJECTION INTRAMUSCULAR; INTRAVENOUS; SUBCUTANEOUS
Status: DISCONTINUED | OUTPATIENT
Start: 2020-09-03 | End: 2020-09-03 | Stop reason: HOSPADM

## 2020-09-03 RX ORDER — HYDROXYZINE HYDROCHLORIDE 25 MG/1
25 TABLET, FILM COATED ORAL
Status: DISCONTINUED | OUTPATIENT
Start: 2020-09-03 | End: 2020-09-03 | Stop reason: HOSPADM

## 2020-09-03 RX ORDER — NALOXONE HYDROCHLORIDE 0.4 MG/ML
.1-.4 INJECTION, SOLUTION INTRAMUSCULAR; INTRAVENOUS; SUBCUTANEOUS
Status: DISCONTINUED | OUTPATIENT
Start: 2020-09-03 | End: 2020-09-03 | Stop reason: HOSPADM

## 2020-09-03 RX ORDER — ONDANSETRON 2 MG/ML
4 INJECTION INTRAMUSCULAR; INTRAVENOUS EVERY 30 MIN PRN
Status: DISCONTINUED | OUTPATIENT
Start: 2020-09-03 | End: 2020-09-03 | Stop reason: HOSPADM

## 2020-09-03 RX ORDER — SODIUM CHLORIDE, SODIUM LACTATE, POTASSIUM CHLORIDE, CALCIUM CHLORIDE 600; 310; 30; 20 MG/100ML; MG/100ML; MG/100ML; MG/100ML
INJECTION, SOLUTION INTRAVENOUS CONTINUOUS
Status: DISCONTINUED | OUTPATIENT
Start: 2020-09-03 | End: 2020-09-03 | Stop reason: HOSPADM

## 2020-09-03 RX ORDER — HYDROXYZINE HYDROCHLORIDE 50 MG/ML
50 INJECTION, SOLUTION INTRAMUSCULAR EVERY 6 HOURS PRN
Status: CANCELLED | OUTPATIENT
Start: 2020-09-03

## 2020-09-03 RX ORDER — HYDROCODONE BITARTRATE AND ACETAMINOPHEN 5; 325 MG/1; MG/1
1-2 TABLET ORAL EVERY 6 HOURS PRN
Qty: 10 TABLET | Refills: 0 | Status: SHIPPED | OUTPATIENT
Start: 2020-09-03

## 2020-09-03 RX ORDER — ACETAMINOPHEN 325 MG/1
975 TABLET ORAL ONCE
Status: COMPLETED | OUTPATIENT
Start: 2020-09-03 | End: 2020-09-03

## 2020-09-03 RX ORDER — ALBUTEROL SULFATE 0.83 MG/ML
2.5 SOLUTION RESPIRATORY (INHALATION) EVERY 4 HOURS PRN
Status: CANCELLED | OUTPATIENT
Start: 2020-09-03

## 2020-09-03 RX ORDER — DIMENHYDRINATE 50 MG/ML
25 INJECTION, SOLUTION INTRAMUSCULAR; INTRAVENOUS
Status: DISCONTINUED | OUTPATIENT
Start: 2020-09-03 | End: 2020-09-03 | Stop reason: HOSPADM

## 2020-09-03 RX ORDER — BUPIVACAINE HYDROCHLORIDE AND EPINEPHRINE 2.5; 5 MG/ML; UG/ML
INJECTION, SOLUTION INFILTRATION; PERINEURAL PRN
Status: DISCONTINUED | OUTPATIENT
Start: 2020-09-03 | End: 2020-09-03 | Stop reason: HOSPADM

## 2020-09-03 RX ORDER — LIDOCAINE HYDROCHLORIDE 20 MG/ML
INJECTION, SOLUTION INFILTRATION; PERINEURAL PRN
Status: DISCONTINUED | OUTPATIENT
Start: 2020-09-03 | End: 2020-09-03

## 2020-09-03 RX ADMIN — LIDOCAINE HYDROCHLORIDE 1 ML: 10 INJECTION, SOLUTION EPIDURAL; INFILTRATION; INTRACAUDAL; PERINEURAL at 13:47

## 2020-09-03 RX ADMIN — CEFOTETAN DISODIUM 1 G: 1 INJECTION, POWDER, FOR SOLUTION INTRAMUSCULAR; INTRAVENOUS at 14:03

## 2020-09-03 RX ADMIN — HYDROCODONE BITARTRATE AND ACETAMINOPHEN 1 TABLET: 5; 325 TABLET ORAL at 15:33

## 2020-09-03 RX ADMIN — SODIUM CHLORIDE, POTASSIUM CHLORIDE, SODIUM LACTATE AND CALCIUM CHLORIDE: 600; 310; 30; 20 INJECTION, SOLUTION INTRAVENOUS at 13:49

## 2020-09-03 RX ADMIN — PROPOFOL 50 MG: 10 INJECTION, EMULSION INTRAVENOUS at 13:58

## 2020-09-03 RX ADMIN — MIDAZOLAM 2 MG: 1 INJECTION INTRAMUSCULAR; INTRAVENOUS at 13:56

## 2020-09-03 RX ADMIN — ACETAMINOPHEN 975 MG: 325 TABLET, FILM COATED ORAL at 13:42

## 2020-09-03 RX ADMIN — LIDOCAINE HYDROCHLORIDE 80 MG: 20 INJECTION, SOLUTION INFILTRATION; PERINEURAL at 13:58

## 2020-09-03 RX ADMIN — PROPOFOL 200 MCG/KG/MIN: 10 INJECTION, EMULSION INTRAVENOUS at 13:58

## 2020-09-03 RX ADMIN — FENTANYL CITRATE 50 MCG: 50 INJECTION, SOLUTION INTRAMUSCULAR; INTRAVENOUS at 14:14

## 2020-09-03 NOTE — ANESTHESIA CARE TRANSFER NOTE
Patient: Ganga Candelario    Procedure(s):  EXAM UNDER ANESTHESIA, RECTUM    Diagnosis: Thrombosed external hemorrhoid [K64.5]  Diagnosis Additional Information: No value filed.    Anesthesia Type:   MAC     Note:  Airway :Face Mask  Patient transferred to:Phase II  Handoff Report: Identifed the Patient, Identified the Reponsible Provider, Reviewed the pertinent medical history, Discussed the surgical course, Reviewed Intra-OP anesthesia mangement and issues during anesthesia, Set expectations for post-procedure period and Allowed opportunity for questions and acknowledgement of understanding      Vitals: (Last set prior to Anesthesia Care Transfer)    CRNA VITALS  9/3/2020 1357 - 9/3/2020 1453      9/3/2020             Resp Rate (observed):  (!) 2                Electronically Signed By: LURDES Ocampo CRNA  September 3, 2020  2:53 PM

## 2020-09-03 NOTE — BRIEF OP NOTE
[facility] Brief Operative Note    Pre-operative diagnosis: Thrombosed external hemorrhoid [K64.5]   Post-operative diagnosis Same   Procedure: Procedure(s):  EXAM UNDER ANESTHESIA, RECTUM with evacuation of thrombosed hemorroid   Surgeon(s): Surgeon(s) and Role:     * Graham Mann MD - Primary   Estimated blood loss: 5 mL    Specimens: * No specimens in log *   Findings: Thrombosed ext hemorrhoid 6 o'clock position.         Graham Mann MD, FACS    #[id number]

## 2020-09-03 NOTE — ANESTHESIA POSTPROCEDURE EVALUATION
Patient: Ganga Candelario    Procedure(s):  EXAM UNDER ANESTHESIA, RECTUM    Diagnosis:Thrombosed external hemorrhoid [K64.5]  Diagnosis Additional Information: No value filed.    Anesthesia Type:  MAC    Note:  Anesthesia Post Evaluation    Patient location during evaluation: Phase 2  Patient participation: Able to fully participate in evaluation  Level of consciousness: awake  Pain management: adequate  Airway patency: patent  Cardiovascular status: acceptable and hemodynamically stable  Respiratory status: acceptable, room air and nonlabored ventilation  Hydration status: stable  PONV: none     Anesthetic complications: None    Comments: Patient was happy with the anesthesia care received and no anesthesia related complications were noted.  I will follow up with the patient again if it is needed.        Last vitals:  Vitals:    09/03/20 1430 09/03/20 1445 09/03/20 1500   BP: 105/52 117/58 120/60   Pulse: 72 64 67   Resp: 14 14    Temp:      SpO2: 98% 99% 99%         Electronically Signed By: LURDES Ocampo CRNA  September 3, 2020  3:05 PM

## 2020-09-03 NOTE — LETTER
9/3/2020         RE: Ganga Candelario  [address] [place] [address]        Dear Colleague,    Thank you for referring your patient, Ganga Candelario, to the [facility]. Please see a copy of my visit note below.    Consult requested by Chavez Obregon    Reason for consultation - hemorrhoids.      HPI:   Patient is a 34-year-old white male with a longstanding history of hemorrhoids but they are usually minor.  He does have a history of an internal hemorrhoid banding in 2018 for bleeding.  He was doing very well up until Sunday when he took a long walk and then developed some discomfort after that.  He denies any constipation obstipation or other complaints but states he does get irritation if he goes off his fiber regimen.  He was off his regimen over the weekend.  He also reports some occasional bleeding with this.  He has been using OTC remedies along with sitz baths.  He has never had a colonoscopy and there is a family history of colon cancer in an aunt.  He now presents to me for evaluation of his hemorrhoids.    No past medical history on file.     No past surgical history on file.     Current Outpatient Medications   Medication     hydrocortisone, Perianal, (HYDROCORTISONE) 2.5 % cream     dibucaine 1 % OINT rectal ointment     HYDROcodone-acetaminophen (NORCO) 5-325 MG tablet     mupirocin (BACTROBAN) 2 % external cream     pramoxine (PROCTOFOAM) 1 % foam     psyllium (METAMUCIL) 58.6 % POWD     triamcinolone (KENALOG) 0.1 % external cream     No current facility-administered medications for this visit.       No Known Allergies    Social History     Socioeconomic History     Marital status:      Spouse name: Not on file     Number of children: Not on file     Years of education: Not on file     Highest education level: Not on file   Occupational History     Not on file   Social Needs     Financial resource strain: Not on file     Food insecurity     Worry: Not on file     Inability: Not  on file     Transportation needs     Medical: Not on file     Non-medical: Not on file   Tobacco Use     Smoking status: Never Smoker     Smokeless tobacco: Never Used   Substance and Sexual Activity     Alcohol use: Yes     Alcohol/week: 1.0 standard drinks     Types: 1 Glasses of wine per week     Comment: very seldom     Drug use: No     Sexual activity: Yes     Partners: Female   Lifestyle     Physical activity     Days per week: Not on file     Minutes per session: Not on file     Stress: Not on file   Relationships     Social connections     Talks on phone: Not on file     Gets together: Not on file     Attends Religion service: Not on file     Active member of club or organization: Not on file     Attends meetings of clubs or organizations: Not on file     Relationship status: Not on file     Intimate partner violence     Fear of current or ex partner: Not on file     Emotionally abused: Not on file     Physically abused: Not on file     Forced sexual activity: Not on file   Other Topics Concern     Not on file   Social History Narrative     Not on file     No family history on file.     ROS: 10 point ROS neg other than the symptoms noted above in the HPI.    PE:  B/P: 132/82, T: Data Unavailable, P: 77, R: Data Unavailable  General: well developed, well nourished WM who appears their stated age  HEENT: NC/AT, EOMI, (-)icterus, (-)injection  Neck: Supple, No JVD  Chest: CTA  Heart: S1, S2, (-)m/r/g  Abd: Soft, non tender, non distended, non tender, no masses  Rectal: Limited - thrombosed ext hemorrhoid - tender  Ext; Warm, no edema  Psych: AAOx3  Neuro: No focal deficits      Impression/plan:  This is a 34-year-old white male with a thrombosed hemorrhoid.  It is quite painful.  I discussed the options of nonoperative treatment Anusol versus a drainage under local or MAC anesthesia.  The patient prefers MAC anesthesia.  After discussion with the patient the plan is time to do this to do an EUA with drainage  of a thrombosed hemorrhoid this afternoon under MAC.   The procedure, risks, benefits, and alternatives were discussed and the patient agrees to proceed.  He is cleared for general anesthesia.  A COVID test to be ordered stat.    Graham Mann MD, FACS          Again, thank you for allowing me to participate in the care of your patient.        Sincerely,        Graham Mann MD

## 2020-09-03 NOTE — OP NOTE
Procedure Date: 09/03/2020      PREOPERATIVE DIAGNOSIS:  Thrombosed external hemorrhoid.      POSTOPERATIVE DIAGNOSIS:  Thrombosed external hemorrhoid.      PROCEDURES PERFORMED:  Rectal examination under anesthesia with evacuation of thrombosed hemorrhoid.      SURGEON:  Graham Sim MD , FACS     ANESTHESIA:  MAC.      INDICATIONS FOR PROCEDURE:  This is a 34-year-old gentleman with a history of hemorrhoids who, after doing a long hike on Sunday, developed rectal pain.  He was seen in the clinic today and found to have a thrombosed hemorrhoid and he was given the option of medical management versus evacuation under local or MAC anesthesia.  He opted for evacuation under MAC anesthesia.      OPERATIVE FINDINGS:  Thrombosed external hemorrhoid 6 o'clock position.  We evacuated approximately 3 mL of clot.      DETAILS OF PROCEDURE:  The patient was taken to the operating room and placed in the supine position.  After receiving some sedation, he was placed in lithotomy.  The perirectal area was prepped and draped in sterile fashion.  A timeout was performed confirming the identity of the patient as well as the procedure to be performed.  A digital rectal exam was then carried out.  There was a slightly inflamed hemorrhoid at the 11 o'clock position, but there was a thrombosed hemorrhoid with necrosis of the tissue at the 6 o'clock position.  This was infiltrated with local anesthetic.  After adequate analgesia was achieved, a cruciate incision was then made in the hemorrhoid itself and approximately 3 mL of clot were evacuated.  After evacuating all the clot, hemostasis was achieved using cautery.  The rectum was then packed with dry gauze.  The patient was then taken from the operating room to the recovery room in stable condition, to be sent home.         GRAHAM SIM MD, FACS             D: 09/03/2020   T: 09/03/2020   MT: LAWANDA      Name:     LISET MOSLEY   MRN:      [id number]        Account:         [id number]   :      1985           Procedure Date: 2020      Document: [id number]

## 2020-09-03 NOTE — PROGRESS NOTES
Consult requested by Chavez Obregon    Reason for consultation - hemorrhoids.      HPI:   Patient is a 34-year-old white male with a longstanding history of hemorrhoids but they are usually minor.  He does have a history of an internal hemorrhoid banding in 2018 for bleeding.  He was doing very well up until Sunday when he took a long walk and then developed some discomfort after that.  He denies any constipation obstipation or other complaints but states he does get irritation if he goes off his fiber regimen.  He was off his regimen over the weekend.  He also reports some occasional bleeding with this.  He has been using OTC remedies along with sitz baths.  He has never had a colonoscopy and there is a family history of colon cancer in an aunt.  He now presents to me for evaluation of his hemorrhoids.    No past medical history on file.     No past surgical history on file.     Current Outpatient Medications   Medication     hydrocortisone, Perianal, (HYDROCORTISONE) 2.5 % cream     dibucaine 1 % OINT rectal ointment     HYDROcodone-acetaminophen (NORCO) 5-325 MG tablet     mupirocin (BACTROBAN) 2 % external cream     pramoxine (PROCTOFOAM) 1 % foam     psyllium (METAMUCIL) 58.6 % POWD     triamcinolone (KENALOG) 0.1 % external cream     No current facility-administered medications for this visit.       No Known Allergies    Social History     Socioeconomic History     Marital status:      Spouse name: Not on file     Number of children: Not on file     Years of education: Not on file     Highest education level: Not on file   Occupational History     Not on file   Social Needs     Financial resource strain: Not on file     Food insecurity     Worry: Not on file     Inability: Not on file     Transportation needs     Medical: Not on file     Non-medical: Not on file   Tobacco Use     Smoking status: Never Smoker     Smokeless tobacco: Never Used   Substance and Sexual Activity     Alcohol use: Yes      Alcohol/week: 1.0 standard drinks     Types: 1 Glasses of wine per week     Comment: very seldom     Drug use: No     Sexual activity: Yes     Partners: Female   Lifestyle     Physical activity     Days per week: Not on file     Minutes per session: Not on file     Stress: Not on file   Relationships     Social connections     Talks on phone: Not on file     Gets together: Not on file     Attends Worship service: Not on file     Active member of club or organization: Not on file     Attends meetings of clubs or organizations: Not on file     Relationship status: Not on file     Intimate partner violence     Fear of current or ex partner: Not on file     Emotionally abused: Not on file     Physically abused: Not on file     Forced sexual activity: Not on file   Other Topics Concern     Not on file   Social History Narrative     Not on file     No family history on file.     ROS: 10 point ROS neg other than the symptoms noted above in the HPI.    PE:  B/P: 132/82, T: Data Unavailable, P: 77, R: Data Unavailable  General: well developed, well nourished WM who appears their stated age  HEENT: NC/AT, EOMI, (-)icterus, (-)injection  Neck: Supple, No JVD  Chest: CTA  Heart: S1, S2, (-)m/r/g  Abd: Soft, non tender, non distended, non tender, no masses  Rectal: Limited - thrombosed ext hemorrhoid - tender  Ext; Warm, no edema  Psych: AAOx3  Neuro: No focal deficits      Impression/plan:  This is a 34-year-old white male with a thrombosed hemorrhoid.  It is quite painful.  I discussed the options of nonoperative treatment Anusol versus a drainage under local or MAC anesthesia.  The patient prefers MAC anesthesia.  After discussion with the patient the plan is time to do this to do an EUA with drainage of a thrombosed hemorrhoid this afternoon under MAC.   The procedure, risks, benefits, and alternatives were discussed and the patient agrees to proceed.  He is cleared for general anesthesia.  A COVID test to be ordered  stat.    Graham Mann MD, FACS

## 2020-09-03 NOTE — NURSING NOTE
[facility]    Ganga Candelario has been given the following teaching information:  Before Your Surgery booklet  Instructions for Showering or Bathing before Surgery  Per SDS patient was directed to COVID screening villareal for stat Covid screening pre-operative...........

## 2020-09-03 NOTE — DISCHARGE INSTRUCTIONS
[facility]   Adult Discharge Orders & Instructions     For 24 hours after surgery    1. Get plenty of rest.  A responsible adult must stay with you for at least 24 hours after you leave the hospital.   2. Do not drive or use heavy equipment.  If you have weakness or tingling, don't drive or use heavy equipment until this feeling goes away.  3. Do not drink alcohol.  4. Avoid strenuous or risky activities.  Ask for help when climbing stairs.   5. You may feel lightheaded.  IF so, sit for a few minutes before standing.  Have someone help you get up.   6. If you have nausea (feel sick to your stomach): Drink only clear liquids such as apple juice, ginger ale, broth or 7-Up.  Rest may also help.  Be sure to drink enough fluids.  Move to a regular diet as you feel able.  7. You may have a slight fever. Call the doctor if your fever is over 100 F (37.7 C) (taken under the tongue) or lasts longer than 24 hours.  8. You may have a dry mouth, a sore throat, muscle aches or trouble sleeping.  These should go away after 24 hours.  9. Do not make important or legal decisions.   Call your doctor for any of the followin.  Signs of infection (fever, growing tenderness at the surgery site, a large amount of drainage or bleeding, severe pain, foul-smelling drainage, redness, swelling).    2. It has been over 8 to 10 hours since surgery and you are still not able to urinate (pass water).    To contact a nurse , call ________[phone]________________________________

## 2020-09-03 NOTE — ANESTHESIA PREPROCEDURE EVALUATION
Anesthesia Pre-Procedure Evaluation    Patient: Ganga Candelario   MRN: [id number] : 1985          Preoperative Diagnosis: Thrombosed external hemorrhoid [K64.5]    Procedure(s):  EXAM UNDER ANESTHESIA, RECTUM    History reviewed. No pertinent past medical history.  History reviewed. No pertinent surgical history.    Anesthesia Evaluation     . Pt has had prior anesthetic.     No history of anesthetic complications          ROS/MED HX    ENT/Pulmonary:       Neurologic:  - neg neurologic ROS     Cardiovascular:  - neg cardiovascular ROS   (+) ----. : . . . :. . No previous cardiac testing       METS/Exercise Tolerance:     Hematologic:  - neg hematologic  ROS       Musculoskeletal:  - neg musculoskeletal ROS       GI/Hepatic:  - neg GI/hepatic ROS      (-) GERD   Renal/Genitourinary:  - ROS Renal section negative       Endo:  - neg endo ROS       Psychiatric:  - neg psychiatric ROS       Infectious Disease:  - neg infectious disease ROS       Malignancy:      - no malignancy   Other:    - neg other ROS                      Physical Exam  Normal systems: cardiovascular, pulmonary and dental    Airway   Mallampati: II  TM distance: >3 FB  Neck ROM: full    Dental     Cardiovascular   Rhythm and rate: regular and normal      Pulmonary    breath sounds clear to auscultation            Lab Results   Component Value Date    WBC 6.3 2018    HGB 9.4 (L) 2018    HCT 29.2 (L) 2018     2018       Preop Vitals  BP Readings from Last 3 Encounters:   20 (!) 152/92   20 132/82   20 126/84    Pulse Readings from Last 3 Encounters:   20 77   20 120   20 102      Resp Readings from Last 3 Encounters:   20 16   20 16   20 14    SpO2 Readings from Last 3 Encounters:   20 99%   20 95%   20 97%      Temp Readings from Last 1 Encounters:   20 98.2  F (36.8  C) (Oral)    Ht Readings from Last 1 Encounters:   20 1.88 m (6'  "2.02\")      Wt Readings from Last 1 Encounters:   09/03/20 72 kg (158 lb 12.8 oz)    Estimated body mass index is 20.38 kg/m  as calculated from the following:    Height as of 9/2/20: 1.88 m (6' 2.02\").    Weight as of an earlier encounter on 9/3/20: 72 kg (158 lb 12.8 oz).       Anesthesia Plan      History & Physical Review  History and physical reviewed and following examination; no interval change.    ASA Status:  2 .    NPO Status:  > 6 hours    Plan for MAC with Intravenous and Propofol induction. Maintenance will be TIVA.  Reason for MAC:  Deep or markedly invasive procedure (G8)    Will perform MAC for a fast lithotomy hemorrhoid.          Postoperative Care  Postoperative pain management:  IV analgesics and Oral pain medications.      Consents  Anesthetic plan, risks, benefits and alternatives discussed with:  Patient.  Use of blood products discussed: No .   .                 LURDES Ocampo CRNA  "

## 2020-09-08 ENCOUNTER — TELEPHONE (OUTPATIENT)
Dept: SURGERY | Facility: OTHER | Age: 35
End: 2020-09-08

## 2020-09-08 NOTE — TELEPHONE ENCOUNTER
Reason for Call:  Other call back    Detailed comments: Patient states he had complications post hemorrhoid surgery with Dr. Mann and his bladder decided not to function correctly.  He went to [facility] over the weekend and now has a cath and was told to follow up with Dr. Mann.  Patient is scheduled for next Monday /first available, but would like to be worked in this Thursday if possible.  Please advise    Phone Number Patient can be reached at: Home number on file [phone] (home)    Best Time: any    Can we leave a detailed message on this number? YES    Call taken on 9/8/2020 at 10:36 AM by Junie Estevez

## 2020-09-08 NOTE — TELEPHONE ENCOUNTER
Spoke with patient, he states he developed urinary retention post op and was cathed at [facility]. Surgically speaking he is feeling good and has no concerns at this time. He is coordinating removal of the cath with [facility] and is anticipated tomorrow or Thursday.  He was offered to be double booked Thursday but would like to wait now until Monday for follow up. He will call before Thursday if something changes in the mean time.  Patient educated about s/s that would warrant seeking care. Discussed with Dr. Mann who is okay with plan.     Mery CHAVEZ, Lead RN, BSN. . .  9/8/2020, 1:37 PM

## 2020-09-14 ENCOUNTER — OFFICE VISIT (OUTPATIENT)
Dept: SURGERY | Facility: CLINIC | Age: 35
End: 2020-09-14
Payer: COMMERCIAL

## 2020-09-14 VITALS
SYSTOLIC BLOOD PRESSURE: 136 MMHG | TEMPERATURE: 97.7 F | WEIGHT: 153.3 LBS | BODY MASS INDEX: 19.67 KG/M2 | HEART RATE: 86 BPM | DIASTOLIC BLOOD PRESSURE: 87 MMHG

## 2020-09-14 DIAGNOSIS — Z98.890 POST-OPERATIVE STATE: Primary | ICD-10-CM

## 2020-09-14 PROCEDURE — 99024 POSTOP FOLLOW-UP VISIT: CPT | Performed by: SPECIALIST

## 2020-09-14 NOTE — LETTER
9/14/2020         RE: Ganga Candelario  [address] [place] [address]        Dear Colleague,    Thank you for referring your patient, Ganga Candelario, to the [facility]. Please see a copy of my visit note below.    F/U for incision of thrombosed hemorrhoid      Subjective:  Patient is feeling not much better.  He did have an episode of urinary retention which required a catheter.  That is now been removed.      Objective:  B/P: 136/87, T: 97.7, P: 86, R: Data Unavailable  Rectal: site healing.    Assessment/plan:  Patient is status post incision and extraction of clot for thrombosed right.  He is doing much better.  He will continue with sitz baths and Anusol.  He will follow-up with me in 2 weeks if necessary.    High-fiber diet was encouraged.    Ganga to follow up with Primary Care provider regarding elevated blood pressure.      Graham Mann MD, FACS.    Again, thank you for allowing me to participate in the care of your patient.        Sincerely,        Graham Mann MD

## 2020-09-14 NOTE — PROGRESS NOTES
F/U for incision of thrombosed hemorrhoid      Subjective:  Patient is feeling not much better.  He did have an episode of urinary retention which required a catheter.  That is now been removed.      Objective:  B/P: 136/87, T: 97.7, P: 86, R: Data Unavailable  Rectal: site healing.    Assessment/plan:  Patient is status post incision and extraction of clot for thrombosed right.  He is doing much better.  He will continue with sitz baths and Anusol.  He will follow-up with me in 2 weeks if necessary.    High-fiber diet was encouraged.    Ganga to follow up with Primary Care provider regarding elevated blood pressure.      Graham Mann MD, FACS.

## (undated) DEVICE — PEN MARKING SKIN

## (undated) DEVICE — DRAPE SHEET REV FOLD 3/4 9349

## (undated) DEVICE — BASIN SET MINOR DISP

## (undated) DEVICE — GLOVE PROTEXIS W/NEU-THERA 7.5  2D73TE75

## (undated) DEVICE — NDL ECLIPSE 22GA 1.5"

## (undated) DEVICE — PACK BASIC SET-UP 9101

## (undated) DEVICE — LABEL MEDICATION SYSTEM  3304

## (undated) DEVICE — NDL ECLIPSE 25GA 1.5"

## (undated) DEVICE — SPONGE RAY-TEC 4X4" 7317

## (undated) DEVICE — DRAPE LEGGINGS 8421

## (undated) DEVICE — SYR 10ML FINGER CONTROL W/O NDL 309695

## (undated) DEVICE — LUBRICATING JELLY 4.25OZ

## (undated) DEVICE — GOWN XLG DISP 9545

## (undated) RX ORDER — PROPOFOL 10 MG/ML
INJECTION, EMULSION INTRAVENOUS
Status: DISPENSED
Start: 2020-09-03

## (undated) RX ORDER — DIPHENHYDRAMINE HYDROCHLORIDE 50 MG/ML
INJECTION INTRAMUSCULAR; INTRAVENOUS
Status: DISPENSED
Start: 2020-09-03

## (undated) RX ORDER — METOPROLOL TARTRATE 1 MG/ML
INJECTION, SOLUTION INTRAVENOUS
Status: DISPENSED
Start: 2020-09-03

## (undated) RX ORDER — LIDOCAINE HYDROCHLORIDE AND EPINEPHRINE 10; 10 MG/ML; UG/ML
INJECTION, SOLUTION INFILTRATION; PERINEURAL
Status: DISPENSED
Start: 2020-09-03

## (undated) RX ORDER — FENTANYL CITRATE 50 UG/ML
INJECTION, SOLUTION INTRAMUSCULAR; INTRAVENOUS
Status: DISPENSED
Start: 2020-09-03

## (undated) RX ORDER — LIDOCAINE HYDROCHLORIDE 20 MG/ML
INJECTION, SOLUTION EPIDURAL; INFILTRATION; INTRACAUDAL; PERINEURAL
Status: DISPENSED
Start: 2020-09-03

## (undated) RX ORDER — BUPIVACAINE HYDROCHLORIDE AND EPINEPHRINE 2.5; 5 MG/ML; UG/ML
INJECTION, SOLUTION EPIDURAL; INFILTRATION; INTRACAUDAL; PERINEURAL
Status: DISPENSED
Start: 2020-09-03

## (undated) RX ORDER — ONDANSETRON 2 MG/ML
INJECTION INTRAMUSCULAR; INTRAVENOUS
Status: DISPENSED
Start: 2020-09-03